# Patient Record
Sex: FEMALE | Race: BLACK OR AFRICAN AMERICAN | URBAN - METROPOLITAN AREA
[De-identification: names, ages, dates, MRNs, and addresses within clinical notes are randomized per-mention and may not be internally consistent; named-entity substitution may affect disease eponyms.]

---

## 2020-12-03 ENCOUNTER — OFFICE VISIT (OUTPATIENT)
Dept: URGENT CARE | Age: 35
End: 2020-12-03
Payer: MEDICAID

## 2020-12-03 VITALS — OXYGEN SATURATION: 99 % | TEMPERATURE: 98.4 F | HEART RATE: 74 BPM | RESPIRATION RATE: 18 BRPM

## 2020-12-03 DIAGNOSIS — R43.2 LOSS OF TASTE: ICD-10-CM

## 2020-12-03 DIAGNOSIS — R05.9 COUGH: Primary | ICD-10-CM

## 2020-12-03 DIAGNOSIS — Z11.59 SCREENING FOR VIRAL DISEASE: ICD-10-CM

## 2020-12-03 PROCEDURE — 99203 OFFICE O/P NEW LOW 30 MIN: CPT | Performed by: FAMILY MEDICINE

## 2020-12-03 RX ORDER — DULOXETIN HYDROCHLORIDE 60 MG/1
60 CAPSULE, DELAYED RELEASE ORAL DAILY
COMMUNITY

## 2020-12-03 RX ORDER — ALPRAZOLAM 2 MG/1
2 TABLET ORAL AS NEEDED
COMMUNITY

## 2020-12-03 NOTE — LETTER
December 3, 2020 Dayami Pinzon [address] [facility] [employer] [address] [place] [id number] Dear Margie Lentz: 
Thank you for requesting access to [employer]. Please follow the instructions below to securely access and download your online medical record. [employer] allows you to send messages to your doctor, view your test results, renew your prescriptions, schedule appointments, and more. How Do I Sign Up? 1. In your internet browser, go to [email or web address]. 2. Click on the First Time User? Click Here link in the Sign In box. You will see the New Member Sign Up page. 3. Enter your [employer] Access Code exactly as it appears below. You will not need to use this code after youve completed the sign-up process. If you do not sign up before the expiration date, you must request a new code. [employer] Access Code: [email or web address] Expires: 1/17/2021  9:00 AM  
 
4. Enter the last four digits of your Social Security Number (xxxx) and Date of Birth (mm/dd/yyyy) as indicated and click Submit. You will be taken to the next sign-up page. 5. Create a [employer] ID. This will be your [employer] login ID and cannot be changed, so think of one that is secure and easy to remember. 6. Create a [employer] password. You can change your password at any time. 7. Enter your Password Reset Question and Answer. This can be used at a later time if you forget your password. 8. Enter your e-mail address. You will receive e-mail notification when new information is available in [address] 9. Click Sign Up. You can now view and download portions of your medical record. 10. Click the Download Summary menu link to download a portable copy of your medical information. Additional Information If you have questions, please visit the Frequently Asked Questions section of the [employer] website at [email or web address] Remember, [employer] is NOT to be used for urgent needs. For medical emergencies, dial 911. Now available from your iPhone and Android! Sincerely, [employer]

## 2020-12-03 NOTE — PROGRESS NOTES
This patient was seen at [address] [place] [address] Urgent Care while in their vehicle due to COVID-19 pandemic with PPE and focused examination in order to decrease community viral transmission. The patient/guardian gave verbal consent to treat. Alaina Jaeger is a 38 y.o. female who presents with cough, nasal congestion, and loss of taste/smell x 1 week. No known COVID-19 contacts, but father has similar sx. Denies fever, SOB. The history is provided by the patient. History reviewed. No pertinent past medical history. History reviewed. No pertinent surgical history. History reviewed. No pertinent family history.      Social History     Socioeconomic History    Marital status: UNKNOWN     Spouse name: Not on file    Number of children: Not on file    Years of education: Not on file    Highest education level: Not on file   Occupational History    Not on file   Social Needs    Financial resource strain: Not on file    Food insecurity     Worry: Not on file     Inability: Not on file    Transportation needs     Medical: Not on file     Non-medical: Not on file   Tobacco Use    Smoking status: Never Smoker    Smokeless tobacco: Never Used   Substance and Sexual Activity    Alcohol use: Not on file    Drug use: Not on file    Sexual activity: Not on file   Lifestyle    Physical activity     Days per week: Not on file     Minutes per session: Not on file    Stress: Not on file   Relationships    Social connections     Talks on phone: Not on file     Gets together: Not on file     Attends Protestant service: Not on file     Active member of club or organization: Not on file     Attends meetings of clubs or organizations: Not on file     Relationship status: Not on file    Intimate partner violence     Fear of current or ex partner: Not on file     Emotionally abused: Not on file     Physically abused: Not on file     Forced sexual activity: Not on file   Other Topics Concern    Not on file   Social History Narrative    Not on file                ALLERGIES: Patient has no known allergies. Review of Systems   Constitutional: Negative for activity change, appetite change, chills and fever. HENT: Positive for congestion and rhinorrhea. Negative for sore throat. Respiratory: Positive for cough. Negative for shortness of breath and wheezing. Cardiovascular: Negative for chest pain. Gastrointestinal: Negative for abdominal pain, diarrhea, nausea and vomiting. Musculoskeletal: Negative for myalgias. Neurological: Negative for headaches. Vitals:    12/03/20 0847   Pulse: 74   Resp: 18   Temp: 98.4 °F (36.9 °C)   SpO2: 99%       Physical Exam  Vitals signs and nursing note reviewed. Constitutional:       General: She is not in acute distress. Appearance: She is well-developed. She is not diaphoretic. Pulmonary:      Effort: Pulmonary effort is normal. No respiratory distress. Breath sounds: Normal breath sounds. No stridor. No wheezing, rhonchi or rales. Neurological:      Mental Status: She is alert. Psychiatric:         Behavior: Behavior normal.         Thought Content: Thought content normal.         Judgment: Judgment normal.         MDM    ICD-10-CM ICD-9-CM   1. Cough  R05 786.2   2. Loss of taste  R43.2 781.1   3.  Screening for viral disease  Z11.59 V73.99       Orders Placed This Encounter    NOVEL CORONAVIRUS (COVID-19)     Scheduling Instructions:      1) Due to current limited availability of the COVID-19 PCR test, tests will be prioritized and may not be completed.              2) Order only if the test result will change clinical management or necessary for a return to mission-critical employment decision.              3) Print and instruct patient to adhere to CDC home isolation program. (Link Above)              4) Set up or refer patient for a monitoring program.              5) Have patient sign up for and leverage [employer] (if not previously done).     Order Specific Question:   Is this test for diagnosis or screening? Answer:   Diagnosis of ill patient     Order Specific Question:   Symptomatic for COVID-19 as defined by CDC? Answer:   Yes     Order Specific Question:   Date of Symptom Onset     Answer:   11/26/2020     Order Specific Question:   Hospitalized for COVID-19? Answer:   No     Order Specific Question:   Admitted to ICU for COVID-19? Answer:   No     Order Specific Question:   Employed in healthcare setting? Answer:   No     Order Specific Question:   Resident in a congregate (group) care setting? Answer:   No     Order Specific Question:   Pregnant? Answer:   No     Order Specific Question:   Previously tested for COVID-19? Answer:   No        Quarantine  Deep breathing exercises, ambulation      If signs and symptoms become worse the pt is to go to the ER.          Procedures

## 2020-12-07 ENCOUNTER — DOCUMENTATION ONLY (OUTPATIENT)
Dept: URGENT CARE | Age: 35
End: 2020-12-07

## 2020-12-07 LAB — SARS-COV-2, NAA: DETECTED

## 2020-12-07 NOTE — PROGRESS NOTES
Called, spoke with Pt. Two pt identifiers confirmed. Pt called in for covid results. Pt informed per NP Arely Isaias she is positive. Asked patient how she was feeling. Pt stated she just lost her taste and smell. Pt informed to quarantine 10-14 days per CDC. Pt informed if symptoms get worse or develop fever or SOB to go to the ER. Pt also informed to follow up with PCP. Pt verbalized understanding of information discussed w/ no further questions at this time.

## 2020-12-08 NOTE — PROGRESS NOTES
I notified patient of positive COVID-19 result. Reports has fatigue and loss of taste/smell otherwise feels fine. Denies SOB, weakness. Eating/drinking well. Advised to quarantine x 14 days from sx onset. ER if SOB, lethargy.

## 2020-12-16 ENCOUNTER — OFFICE VISIT (OUTPATIENT)
Dept: URGENT CARE | Age: 35
End: 2020-12-16
Payer: MEDICAID

## 2020-12-16 VITALS — OXYGEN SATURATION: 99 % | TEMPERATURE: 98.7 F | RESPIRATION RATE: 18 BRPM | HEART RATE: 65 BPM

## 2020-12-16 DIAGNOSIS — Z20.822 EXPOSURE TO COVID-19 VIRUS: Primary | ICD-10-CM

## 2020-12-16 PROCEDURE — 99212 OFFICE O/P EST SF 10 MIN: CPT | Performed by: FAMILY MEDICINE

## 2020-12-16 NOTE — PROGRESS NOTES
This patient was seen at [address] [place] [address] Urgent Care while in their vehicle due to COVID-19 pandemic with PPE and focused examination in order to decrease community viral transmission. The patient/guardian gave verbal consent to treat. here for repeat test  H/o covid  2- weeks before- still no taste/ smell- no other sxs    History reviewed. No pertinent past medical history. History reviewed. No pertinent surgical history. History reviewed. No pertinent family history. Social History     Socioeconomic History    Marital status: UNKNOWN     Spouse name: Not on file    Number of children: Not on file    Years of education: Not on file    Highest education level: Not on file   Occupational History    Not on file   Social Needs    Financial resource strain: Not on file    Food insecurity     Worry: Not on file     Inability: Not on file    Transportation needs     Medical: Not on file     Non-medical: Not on file   Tobacco Use    Smoking status: Never Smoker    Smokeless tobacco: Never Used   Substance and Sexual Activity    Alcohol use: Not on file    Drug use: Not on file    Sexual activity: Not on file   Lifestyle    Physical activity     Days per week: Not on file     Minutes per session: Not on file    Stress: Not on file   Relationships    Social connections     Talks on phone: Not on file     Gets together: Not on file     Attends Faith service: Not on file     Active member of club or organization: Not on file     Attends meetings of clubs or organizations: Not on file     Relationship status: Not on file    Intimate partner violence     Fear of current or ex partner: Not on file     Emotionally abused: Not on file     Physically abused: Not on file     Forced sexual activity: Not on file   Other Topics Concern    Not on file   Social History Narrative    Not on file                ALLERGIES: Patient has no known allergies.     Review of Systems   All other systems reviewed and are negative. Vitals:    12/16/20 1214   Pulse: 65   Resp: 18   Temp: 98.7 °F (37.1 °C)   SpO2: 99%       Physical Exam  Vitals signs and nursing note reviewed. Constitutional:       General: She is not in acute distress. Appearance: She is not ill-appearing. Pulmonary:      Effort: Pulmonary effort is normal. No respiratory distress. Breath sounds: No wheezing. MDM    Procedures             ICD-10-CM ICD-9-CM    1. Exposure to COVID-19 virus  Z20.828 V01.79 NOVEL CORONAVIRUS (COVID-19)     No orders of the defined types were placed in this encounter. No results found for any visits on 12/16/20. The patients condition was discussed with the patient and they understand. The patient is to follow up with primary care doctor. If signs and symptoms become worse the pt is to go to the ER. The patient is to take medications as prescribed.

## 2020-12-18 LAB — SARS-COV-2, NAA: NOT DETECTED

## 2023-05-11 RX ORDER — ALPRAZOLAM 2 MG/1
TABLET ORAL PRN
COMMUNITY

## 2023-05-11 RX ORDER — DULOXETIN HYDROCHLORIDE 60 MG/1
60 CAPSULE, DELAYED RELEASE ORAL DAILY
COMMUNITY

## 2023-09-12 ENCOUNTER — OFFICE VISIT (OUTPATIENT)
Age: 38
End: 2023-09-12
Payer: COMMERCIAL

## 2023-09-12 ENCOUNTER — TELEPHONE (OUTPATIENT)
Age: 38
End: 2023-09-12

## 2023-09-12 VITALS — WEIGHT: 225 LBS | DIASTOLIC BLOOD PRESSURE: 72 MMHG | SYSTOLIC BLOOD PRESSURE: 116 MMHG

## 2023-09-12 DIAGNOSIS — O21.9 NAUSEA AND VOMITING OF PREGNANCY, ANTEPARTUM: ICD-10-CM

## 2023-09-12 DIAGNOSIS — Z32.00 ENCOUNTER FOR CONFIRMATION OF PREGNANCY TEST RESULT WITH PHYSICAL EXAMINATION: ICD-10-CM

## 2023-09-12 DIAGNOSIS — N92.6 MISSED MENSES: Primary | ICD-10-CM

## 2023-09-12 LAB
HCG, PREGNANCY, URINE, POC: POSITIVE
VALID INTERNAL CONTROL, POC: YES

## 2023-09-12 PROCEDURE — 81025 URINE PREGNANCY TEST: CPT | Performed by: OBSTETRICS & GYNECOLOGY

## 2023-09-12 PROCEDURE — 99203 OFFICE O/P NEW LOW 30 MIN: CPT | Performed by: OBSTETRICS & GYNECOLOGY

## 2023-09-12 RX ORDER — ONDANSETRON 4 MG/1
4 TABLET, FILM COATED ORAL EVERY 8 HOURS PRN
Qty: 60 TABLET | Refills: 1 | Status: SHIPPED | OUTPATIENT
Start: 2023-09-12

## 2023-09-12 NOTE — PROGRESS NOTES
Pregnancy Confirmation and Problem Visit    Harjinder Vazquez is a 43 y.o.  at [facility] [place] by unsure LMP presenting for problem visit. Her main concern today is: newly pregnant with n/v.    Believes she's going through 'HG'. Can't eat or drink and urinates only once all day. 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 Sleeps all the time. Has had weight loss surgery (gastric sleeve) and 'wasn't big on the vitamins she was suppose to take'. Unsure if this is related. 1 son - 22 yo [address]    Ob/Gyn Hx:  - 1    LMP- 23, unsure  Menarche- approx 12yo  Menses- regular  Contraception- none  STI- denies  ? SA- yes    Health maintenance:  Pap-unsure; denies hx of abnormal pap smears in the past  Gardasil- never    History reviewed. No pertinent past medical history. No past surgical history on file. No family history on file.     Social History     Socioeconomic History    Marital status: Unknown     Spouse name: Not on file    Number of children: Not on file    Years of education: Not on file    Highest education level: Not on file   Occupational History    Not on file   Tobacco Use    Smoking status: Never    Smokeless tobacco: Never   Substance and Sexual Activity    Alcohol use: Not on file    Drug use: Not on file    Sexual activity: Not on file   Other Topics Concern    Not on file   Social History Narrative    Not on file     Social Determinants of Health     Financial Resource Strain: Not on file   Food Insecurity: Not on file   Transportation Needs: Not on file   Physical Activity: Not on file   Stress: Not on file   Social Connections: Not on file   Intimate Partner Violence: Not on file   Housing Stability: Not on file       Current Outpatient Medications   Medication Sig Dispense Refill    Prenatal Vit-Fe Fumarate-FA (PRENATAL PO) Take by mouth      DULoxetine (CYMBALTA) 60 MG extended release capsule Take 1 capsule by mouth daily      ALPRAZolam (XANAX) 2 MG tablet Take by mouth

## 2023-09-12 NOTE — TELEPHONE ENCOUNTER
Patient calling to complain of n/v in pregnancy. Due to not being a current patient of our practice, unable to offer medical advice. Advised patient to seek urgent care if she felt it was needed. Offered to place patient on schedule today with Dr. Jaquan Davis for a confirmation of pregnancy visit and to discuss a plan of care.   Patient accepted appointment for today at 10:40 AM.

## 2023-09-14 RX ORDER — PROMETHAZINE HYDROCHLORIDE 12.5 MG/1
12.5 TABLET ORAL EVERY 6 HOURS PRN
Qty: 30 TABLET | Refills: 2 | Status: SHIPPED | OUTPATIENT
Start: 2023-09-14

## 2023-09-18 ENCOUNTER — INITIAL PRENATAL (OUTPATIENT)
Age: 38
End: 2023-09-18

## 2023-09-18 VITALS — DIASTOLIC BLOOD PRESSURE: 66 MMHG | WEIGHT: 224.8 LBS | SYSTOLIC BLOOD PRESSURE: 100 MMHG

## 2023-09-18 DIAGNOSIS — Z34.90 PREGNANCY, UNSPECIFIED GESTATIONAL AGE: ICD-10-CM

## 2023-09-18 DIAGNOSIS — Z01.419 WELL WOMAN EXAM WITH ROUTINE GYNECOLOGICAL EXAM: Primary | ICD-10-CM

## 2023-09-18 DIAGNOSIS — Z11.3 SCREENING EXAMINATION FOR STD (SEXUALLY TRANSMITTED DISEASE): ICD-10-CM

## 2023-09-18 DIAGNOSIS — Z11.51 ENCOUNTER FOR SCREENING FOR HUMAN PAPILLOMAVIRUS (HPV): ICD-10-CM

## 2023-09-18 PROCEDURE — 0502F SUBSEQUENT PRENATAL CARE: CPT | Performed by: OBSTETRICS & GYNECOLOGY

## 2023-09-18 NOTE — PROGRESS NOTES
Status:  Orientation: grossly oriented to person, place and time  Mood and Affect: mood normal, affect appropriate      Assessment/Plan:  Primary Provider: Yelena Brown    43 y.o.  with MARTIN 24 by 9 wk RUIZ US (unsure LMP). H/o CS x1 - NRFS, was 10cm, but only pushed for a short duration when had FHR decelerations and was taken for CS. Strongly desires REPEAT CS at 39 wks, declines TOLAC. IUP: ordonez viable IUP on 9 wk scan, ?chorionic bump vs. Hematoma --> Repeat US next visit  -Anatomy scan: with MFM dt AMA and h/o bariatric surgery  -counseled on nutrition and proper weight gain in pregnancy as well as foods to avoid  -discussed other high yield topics including appropriate exercise levels, sexual activity, toxoplasmosis precautions, toxin avoidance, travel advice (including zika travel warnings), covid precautions    Pregnancy Problems:  -severe N/V - unisom/B6, phenergan (zofran not tolerated) --> advised urgent care if unable to keep fluids down as may need IV hydration and electrolyte checks, will cont to monitor weight  -AMA - aneuploidy screen next visit, FAS with MFM, ANS    PMH:  -h/o gastric sleeve - continue PNV, advised her to check with bariatric specialist about vitamin/micronutrient supplementation in pregnancy, discussed total vit A should be limited to 5000 IU daily to avoid retinoid embryopathy. Consider supplements of the following after bariatric procedures: B1 1.4mg, VitD 400IU, vitK 120mcg, zinc 11mg, biotin 30mcg, iron 65mg, folate 800mcg, calcium citrate 1200mg, vit B12 500mcg/day PO.  Labs at next visit w/ PNL: CBC, ferritin, iron, B12, thiamine, folate, calcium, vit D.   -anxiety/depression - on cymbalta 60mg daily    Genetics/Carrier screening: pano/horizon next visit    PNL: new OB labs next visit / pap   -Glucola: NOT RECOMMENDED d/t bariatric surgery --> will need to check sugars x 1 week  -28 week

## 2023-09-19 ENCOUNTER — CLINICAL DOCUMENTATION (OUTPATIENT)
Age: 38
End: 2023-09-19

## 2023-09-19 ENCOUNTER — TELEPHONE (OUTPATIENT)
Age: 38
End: 2023-09-19

## 2023-09-19 NOTE — TELEPHONE ENCOUNTER
Patient called, name and  verified. Patient is returning Dr. Marylu Carbajal call. She is okay with the US and appt change but stated that the message said you wanted to go over the findings from the ultra sound. If you could give her a call at your earliest convenience.

## 2023-09-19 NOTE — PROGRESS NOTES
MD Elizabeth Awad, FRANCISCON  Please schedule patient for repeat US next visit to follow up placental finding ?hematoma/chorionic bump, unclear clinical significance. I forgot to review this with patient at her visit, but have called her and left a voicemail to discuss, if she calls back or if you are able to get in touch with her I would be glad to speak with her about this. thank you! Seferino Ray MD  9/18/2023  5:16 PM     US added to next visit at 2:30 pm per MD.  Bo Lr sent to patient advising her of added Ultrasound.

## 2023-09-21 ENCOUNTER — TELEPHONE (OUTPATIENT)
Age: 38
End: 2023-09-21

## 2023-09-21 NOTE — TELEPHONE ENCOUNTER
Dr. Boone Shreyas response: Called again to reach patient to discuss ultrasound findings, no response x2, left voicemail for her to call back so that we can review her results.  ?chorionic bump

## 2023-09-21 NOTE — TELEPHONE ENCOUNTER
Patient returned your call to go over US findings from previous imaging. She is available any time today to discuss.

## 2023-09-22 ENCOUNTER — TELEPHONE (OUTPATIENT)
Age: 38
End: 2023-09-22

## 2023-09-22 NOTE — TELEPHONE ENCOUNTER
Patient is calling in returning your phone call. She said you called yesterday evening but she was sleep. She is requesting a call back and she stated that if you are unable to reach her, then it is okay to call her Mother Dayana Irizarry @ [phone]. HIPAA verified, mom is on her release of information.

## 2023-09-24 LAB
C TRACH RRNA CVX QL NAA+PROBE: NEGATIVE
CYTOLOGIST CVX/VAG CYTO: NORMAL
CYTOLOGY CVX/VAG DOC CYTO: NORMAL
CYTOLOGY CVX/VAG DOC THIN PREP: NORMAL
DX ICD CODE: NORMAL
HPV GENOTYPE REFLEX: NORMAL
HPV I/H RISK 4 DNA CVX QL PROBE+SIG AMP: NEGATIVE
Lab: NORMAL
N GONORRHOEA RRNA CVX QL NAA+PROBE: NEGATIVE
OTHER STN SPEC: NORMAL
STAT OF ADQ CVX/VAG CYTO-IMP: NORMAL
T VAGINALIS RRNA SPEC QL NAA+PROBE: NEGATIVE

## 2023-10-16 ENCOUNTER — ROUTINE PRENATAL (OUTPATIENT)
Age: 38
End: 2023-10-16

## 2023-10-16 VITALS — WEIGHT: 234.2 LBS | DIASTOLIC BLOOD PRESSURE: 70 MMHG | SYSTOLIC BLOOD PRESSURE: 106 MMHG

## 2023-10-16 DIAGNOSIS — Z34.90 PREGNANCY, UNSPECIFIED GESTATIONAL AGE: Primary | ICD-10-CM

## 2023-10-16 DIAGNOSIS — Z98.84 HISTORY OF GASTRIC BYPASS: ICD-10-CM

## 2023-10-16 PROCEDURE — 0502F SUBSEQUENT PRENATAL CARE: CPT | Performed by: OBSTETRICS & GYNECOLOGY

## 2023-10-16 NOTE — PROGRESS NOTES
TA ULTRASOUND PERFORMED 10/16/23  A SINGLE VIABLE 12W6D IUP IS SEEN WITH NORMAL CARDIAC RHYTHM. GESTATIONAL AGE BASED ON PREVIOUS ULTRASOUND. POSSIBLE CHORONIC BUMP NOT VISUALIZED ON  TODAYS EXAM. THERE APPEARS TO BE A FUNDAL PLACENTA. A NORMAL YOLK SAC IS SEEN. RIGHT OVARY APPEARS WNL. LEFT OVARY APPEARS WNL. NO FREE FLUID IS SEEN IN THE CDS. IOB labs today, desires panorama, declines horizon. N/V significantly improved  Declines flu vaccine today  Inquiring about possibility of BTL/bilateral salpingectomy given severity of N/V symptoms with this pregnancy and other factors making her medically high risk for any future pregnancies (AMA, obesity, prior bariatric surgery, this will be her 2nd CS). Requested ethics consult.     Mere Lira MD  10/16/2023  3:47 PM

## 2023-10-17 LAB
25(OH)D3+25(OH)D2 SERPL-MCNC: 30.8 NG/ML (ref 30–100)
ABO GROUP BLD: NORMAL
BLD GP AB SCN SERPL QL: NEGATIVE
CALCIUM SERPL-MCNC: 9.3 MG/DL (ref 8.7–10.2)
ERYTHROCYTE [DISTWIDTH] IN BLOOD BY AUTOMATED COUNT: 17 % (ref 11.7–15.4)
FERRITIN SERPL-MCNC: 12 NG/ML (ref 15–150)
FOLATE SERPL-MCNC: 19 NG/ML
HBV SURFACE AG SERPL QL IA: NEGATIVE
HCT VFR BLD AUTO: 32.2 % (ref 34–46.6)
HCV IGG SERPL QL IA: NON REACTIVE
HGB BLD-MCNC: 10.3 G/DL (ref 11.1–15.9)
HIV 1+2 AB+HIV1 P24 AG SERPL QL IA: NON REACTIVE
IRON SATN MFR SERPL: 8 % (ref 15–55)
IRON SERPL-MCNC: 35 UG/DL (ref 27–159)
MCH RBC QN AUTO: 24.5 PG (ref 26.6–33)
MCHC RBC AUTO-ENTMCNC: 32 G/DL (ref 31.5–35.7)
MCV RBC AUTO: 77 FL (ref 79–97)
PLATELET # BLD AUTO: 257 X10E3/UL (ref 150–450)
RBC # BLD AUTO: 4.2 X10E6/UL (ref 3.77–5.28)
RH BLD: POSITIVE
RUBV IGG SERPL IA-ACNC: 2.94 INDEX
TIBC SERPL-MCNC: 421 UG/DL (ref 250–450)
UIBC SERPL-MCNC: 386 UG/DL (ref 131–425)
VIT B12 SERPL-MCNC: 305 PG/ML (ref 232–1245)
VZV IGG SER IA-ACNC: 269 INDEX
WBC # BLD AUTO: 7 X10E3/UL (ref 3.4–10.8)

## 2023-10-18 LAB — TREPONEMA PALLIDUM IGG+IGM AB [PRESENCE] IN SERUM OR PLASMA BY IMMUNOASSAY: NON REACTIVE

## 2023-10-19 LAB
BACTERIA UR CULT: NORMAL
HGB A MFR BLD ELPH: 97.8 % (ref 96.4–98.8)
HGB A2 MFR BLD ELPH: 2.2 % (ref 1.8–3.2)
HGB F MFR BLD ELPH: 0 % (ref 0–2)
HGB FRACT BLD-IMP: NORMAL
HGB S MFR BLD ELPH: 0 %

## 2023-11-13 ENCOUNTER — ROUTINE PRENATAL (OUTPATIENT)
Age: 38
End: 2023-11-13

## 2023-11-13 VITALS — DIASTOLIC BLOOD PRESSURE: 76 MMHG | WEIGHT: 243.8 LBS | SYSTOLIC BLOOD PRESSURE: 116 MMHG

## 2023-11-13 DIAGNOSIS — O09.522 MULTIGRAVIDA OF ADVANCED MATERNAL AGE IN SECOND TRIMESTER: ICD-10-CM

## 2023-11-13 DIAGNOSIS — Z98.84 HISTORY OF BARIATRIC SURGERY: ICD-10-CM

## 2023-11-13 DIAGNOSIS — Z34.90 PREGNANCY, UNSPECIFIED GESTATIONAL AGE: Primary | ICD-10-CM

## 2023-11-13 PROCEDURE — 0502F SUBSEQUENT PRENATAL CARE: CPT | Performed by: OBSTETRICS & GYNECOLOGY

## 2023-11-13 NOTE — PROGRESS NOTES
Nausea improved; now only with laying down  Now worried she is gaining too much weight. Discussed diet/exercise.   Declines msafp today  Referral to MFM placed for FAS  Having a girl!!!!!!!

## 2023-12-11 ENCOUNTER — ROUTINE PRENATAL (OUTPATIENT)
Age: 38
End: 2023-12-11
Payer: COMMERCIAL

## 2023-12-11 VITALS
DIASTOLIC BLOOD PRESSURE: 75 MMHG | HEART RATE: 101 BPM | SYSTOLIC BLOOD PRESSURE: 119 MMHG | BODY MASS INDEX: 32.17 KG/M2 | HEIGHT: 72 IN

## 2023-12-11 DIAGNOSIS — Z3A.20 20 WEEKS GESTATION OF PREGNANCY: Primary | ICD-10-CM

## 2023-12-11 PROCEDURE — 76811 OB US DETAILED SNGL FETUS: CPT | Performed by: OBSTETRICS & GYNECOLOGY

## 2023-12-11 PROCEDURE — 99214 OFFICE O/P EST MOD 30 MIN: CPT | Performed by: OBSTETRICS & GYNECOLOGY

## 2023-12-11 PROCEDURE — 76817 TRANSVAGINAL US OBSTETRIC: CPT | Performed by: OBSTETRICS & GYNECOLOGY

## 2023-12-12 ENCOUNTER — ROUTINE PRENATAL (OUTPATIENT)
Age: 38
End: 2023-12-12

## 2023-12-12 VITALS — BODY MASS INDEX: 32.72 KG/M2 | WEIGHT: 248 LBS | SYSTOLIC BLOOD PRESSURE: 122 MMHG | DIASTOLIC BLOOD PRESSURE: 78 MMHG

## 2023-12-12 DIAGNOSIS — Z34.90 PREGNANCY, UNSPECIFIED GESTATIONAL AGE: Primary | ICD-10-CM

## 2023-12-12 PROCEDURE — 0502F SUBSEQUENT PRENATAL CARE: CPT | Performed by: OBSTETRICS & GYNECOLOGY

## 2023-12-12 NOTE — PROGRESS NOTES
Pt reports with no medical complaints.    MFM yesterday notable for short cervix --> repeat scan in 1 week    RTC 4 wks

## 2023-12-19 PROBLEM — O09.522 MULTIGRAVIDA OF ADVANCED MATERNAL AGE IN SECOND TRIMESTER: Status: ACTIVE | Noted: 2023-12-19

## 2023-12-19 PROBLEM — Z98.84 HISTORY OF BARIATRIC SURGERY: Status: ACTIVE | Noted: 2023-12-19

## 2023-12-19 PROBLEM — E66.811 CLASS 1 OBESITY DUE TO EXCESS CALORIES WITHOUT SERIOUS COMORBIDITY WITH BODY MASS INDEX (BMI) OF 32.0 TO 32.9 IN ADULT: Status: ACTIVE | Noted: 2023-12-19

## 2023-12-19 PROBLEM — N88.3 SHORT CERVIX: Status: ACTIVE | Noted: 2023-12-19

## 2023-12-19 PROBLEM — E66.09 CLASS 1 OBESITY DUE TO EXCESS CALORIES WITHOUT SERIOUS COMORBIDITY WITH BODY MASS INDEX (BMI) OF 32.0 TO 32.9 IN ADULT: Status: ACTIVE | Noted: 2023-12-19

## 2023-12-28 ENCOUNTER — ROUTINE PRENATAL (OUTPATIENT)
Age: 38
End: 2023-12-28

## 2023-12-28 VITALS — DIASTOLIC BLOOD PRESSURE: 74 MMHG | HEART RATE: 73 BPM | SYSTOLIC BLOOD PRESSURE: 115 MMHG

## 2023-12-28 DIAGNOSIS — N88.3 SHORT CERVIX: Primary | ICD-10-CM

## 2023-12-28 DIAGNOSIS — Z98.84 HISTORY OF BARIATRIC SURGERY: ICD-10-CM

## 2023-12-28 DIAGNOSIS — E66.09 CLASS 1 OBESITY DUE TO EXCESS CALORIES WITHOUT SERIOUS COMORBIDITY WITH BODY MASS INDEX (BMI) OF 32.0 TO 32.9 IN ADULT: ICD-10-CM

## 2023-12-28 DIAGNOSIS — O09.522 MULTIGRAVIDA OF ADVANCED MATERNAL AGE IN SECOND TRIMESTER: ICD-10-CM

## 2023-12-28 NOTE — PROCEDURES
PATIENT: Julisa Holder   -  : 1985   -  DOS:2023   -  INTERPRETING PROVIDER:[place], Forestine Bread,   Indication  ========    Advanced Maternal Age, History of bariatric surgery    Method  ======    Transabdominal and transvaginal ultrasound examination. View: Sufficient    Pregnancy  =========    Rene pregnancy. Number of fetuses: 1    Dating  ======    LMP on: 2023  GA by LMP 23 w + 2 d  MARTIN by LMP: 2024  Assigned: based on the LMP, selected on 2023  Assigned GA 23 w + 2 d  Assigned MARTIN: 2024    General Evaluation  ==============    Cardiac activity present.  bpm. Fetal movements: visualized. Presentation: Transverse, head to maternal left  Placenta: Placental site: posterior, appropriate distance from the internal os  Umbilical cord: Cord vessels: 3 vessel cord. Insertion site: placental insertion normal  Amniotic fluid: Amount of AF: normal. MVP 5.4 cm    Fetal Anatomy  ===========    Stomach: normal  Kidneys: normal  Bladder: normal  Wants to know fetal sex: yes    Maternal Structures  ===============    Uterus / Cervix  Cervix: Visualized  Approach: Transvaginal  Cervical length 3.54 cm  Second approach: Transvaginal  Cervical length (2) 3.08 cm  Funneling: Funneling absent  Other: s/p pressure- 3.47 cm    Findings  =======    Viable Rene pregnancy at 23w 2d by clinical dates. Anatomy visualized as stated above. Amniotic fluid is normal.  Placental site is posterior, appropriate distance from the internal os. Transverse, head to maternal left presentation. Transvaginal ultrasound was performed to s/p pressure- 3.47 cm. The cervix measures 3.54 cm in length. There is no evidence of funneling with and without fundal pressure.     Plan of Care  ==========    This 43year old  with the following issues:    #1 AMA/class 1 obesity  -fetal anatomy is consistent with low risk NIPT; declined amniocentesis  -prophylactic low dose ASA is recommended; patient

## 2024-01-09 ENCOUNTER — ROUTINE PRENATAL (OUTPATIENT)
Age: 39
End: 2024-01-09

## 2024-01-09 VITALS — SYSTOLIC BLOOD PRESSURE: 110 MMHG | DIASTOLIC BLOOD PRESSURE: 74 MMHG | BODY MASS INDEX: 33.78 KG/M2 | WEIGHT: 256 LBS

## 2024-01-09 DIAGNOSIS — Z34.90 PREGNANCY, UNSPECIFIED GESTATIONAL AGE: Primary | ICD-10-CM

## 2024-01-09 PROCEDURE — 0502F SUBSEQUENT PRENATAL CARE: CPT | Performed by: OBSTETRICS & GYNECOLOGY

## 2024-01-09 RX ORDER — LANCETS 30 GAUGE
EACH MISCELLANEOUS
Qty: 100 EACH | Refills: 5 | Status: SHIPPED | OUTPATIENT
Start: 2024-01-09

## 2024-01-09 RX ORDER — LANCETS 30 GAUGE
EACH MISCELLANEOUS
Qty: 100 EACH | Refills: 5 | Status: SHIPPED | OUTPATIENT
Start: 2024-01-09 | End: 2024-01-09 | Stop reason: SDUPTHER

## 2024-01-09 RX ORDER — BLOOD-GLUCOSE METER
KIT MISCELLANEOUS
Qty: 1 KIT | Refills: 0 | Status: SHIPPED | OUTPATIENT
Start: 2024-01-09

## 2024-01-09 RX ORDER — GLUCOSAMINE HCL/CHONDROITIN SU 500-400 MG
CAPSULE ORAL
Qty: 100 STRIP | Refills: 0 | Status: SHIPPED | OUTPATIENT
Start: 2024-01-09

## 2024-01-09 RX ORDER — BLOOD-GLUCOSE METER
KIT MISCELLANEOUS
Qty: 1 KIT | Refills: 0 | Status: SHIPPED | OUTPATIENT
Start: 2024-01-09 | End: 2024-01-09 | Stop reason: SDUPTHER

## 2024-01-09 RX ORDER — DULOXETIN HYDROCHLORIDE 60 MG/1
60 CAPSULE, DELAYED RELEASE ORAL DAILY
Qty: 30 CAPSULE | Refills: 3 | Status: SHIPPED | OUTPATIENT
Start: 2024-01-09

## 2024-01-09 RX ORDER — GLUCOSAMINE HCL/CHONDROITIN SU 500-400 MG
CAPSULE ORAL
Qty: 100 STRIP | Refills: 0 | Status: SHIPPED | OUTPATIENT
Start: 2024-01-09 | End: 2024-01-09 | Stop reason: SDUPTHER

## 2024-01-09 RX ORDER — ASPIRIN 81 MG/1
81 TABLET, CHEWABLE ORAL DAILY
COMMUNITY

## 2024-01-09 NOTE — PROGRESS NOTES
Saw PNC 12/28, CL back in normal range. follow up 1/11 for growth scan.   No glucola today d/t bariatric surgery --> check BG x 1 week, log and supplies provided  Will plan 3rd tri labs and tdap next visit  Doing well overall  Some heart burn.  Suggested Tums/pepcid PRN  Ran out of Cymbalta 60 mg; refill provided today

## 2024-01-11 ENCOUNTER — ROUTINE PRENATAL (OUTPATIENT)
Age: 39
End: 2024-01-11

## 2024-01-11 VITALS — SYSTOLIC BLOOD PRESSURE: 101 MMHG | HEART RATE: 85 BPM | DIASTOLIC BLOOD PRESSURE: 65 MMHG

## 2024-01-11 DIAGNOSIS — Z98.84 HISTORY OF BARIATRIC SURGERY: ICD-10-CM

## 2024-01-11 DIAGNOSIS — N88.3 SHORT CERVIX: ICD-10-CM

## 2024-01-11 DIAGNOSIS — E66.09 CLASS 1 OBESITY DUE TO EXCESS CALORIES WITHOUT SERIOUS COMORBIDITY WITH BODY MASS INDEX (BMI) OF 32.0 TO 32.9 IN ADULT: ICD-10-CM

## 2024-01-11 DIAGNOSIS — Z98.891 HISTORY OF CESAREAN SECTION, UNKNOWN SCAR: ICD-10-CM

## 2024-01-11 DIAGNOSIS — Z3A.25 25 WEEKS GESTATION OF PREGNANCY: Primary | ICD-10-CM

## 2024-01-11 DIAGNOSIS — O09.522 MULTIGRAVIDA OF ADVANCED MATERNAL AGE IN SECOND TRIMESTER: ICD-10-CM

## 2024-01-11 NOTE — PROCEDURES
PATIENT: KIRILL LINCOLN   -  : 1985   -  DOS:2024   -  INTERPRETING PROVIDER:Anita Kitchen,   Indication  ========    Advanced Maternal Age, History of bariatric surgery    Method  ======    Transabdominal ultrasound examination. View: Good view    Pregnancy  =========    Rene pregnancy. Number of fetuses: 1    Dating  ======    LMP on: 2023  GA by LMP 25 w + 2 d  MARTIN by LMP: 2024  Ultrasound examination on: 2024  GA by U/S based upon: AC, BPD, Femur, HC  GA by U/S 25 w + 4 d  MARTIN by U/S: 2024  Assigned: based on the LMP, selected on 2023  Assigned GA 25 w + 2 d  Assigned MARTIN: 2024    Fetal Biometry  ============    Standard  BPD 61.1 mm 24w 6d 26% Hadlock  OFD 83.9 mm 27w 2d 94% Mandy  .5 mm 25w 1d 24% Hadlock  Cerebellum tr 30.8 mm 26w 4d 87% Hill  .0 mm 26w 1d 69% Hadlock  Femur 48.5 mm 26w 2d 68% Hadlock  Humerus 43.1 mm 25w 5d 59% Mandy   g 25w 5d 72% Hadlock  EFW (lb) 1 lb  EFW (oz) 15 oz  EFW by: Hadlock (BPD-HC-AC-FL)  Extended   3.8 mm  Other Structures   bpm    General Evaluation  ==============    Cardiac activity present.  bpm. Fetal movements: visualized. Presentation: Breech  Placenta: Placental site: posterior, appropriate distance from the internal os  Umbilical cord: Cord vessels: 3 vessel cord. Insertion site: placental insertion normal  Amniotic fluid: Amount of AF: normal. MVP 4.6 cm. NICO 15.5 cm. Q1 2.8 cm, Q2 4.4 cm, Q3 3.8 cm, Q4 4.6 cm    Fetal Anatomy  ===========    Stomach: normal  Kidneys: normal  Bladder: normal  Wants to know fetal sex: yes    Findings  =======    Viable Rene pregnancy at 25w 2d by clinical dates.  EFW is 884 g at 72%, abdominal circumference at 69%.  Anatomy visualized as stated above.  Amniotic fluid is normal.  Placenta is posterior, appropriate distance from the internal os.  Breech presentation.    Plan of Care  ==========    This 38 year old  with the following

## 2024-01-30 DIAGNOSIS — M79.606 PREGNANCY RELATED LEG PAIN IN THIRD TRIMESTER, ANTEPARTUM: Primary | ICD-10-CM

## 2024-01-30 DIAGNOSIS — O26.893 PREGNANCY RELATED LEG PAIN IN THIRD TRIMESTER, ANTEPARTUM: Primary | ICD-10-CM

## 2024-02-05 SDOH — ECONOMIC STABILITY: FOOD INSECURITY: WITHIN THE PAST 12 MONTHS, YOU WORRIED THAT YOUR FOOD WOULD RUN OUT BEFORE YOU GOT MONEY TO BUY MORE.: NEVER TRUE

## 2024-02-05 SDOH — ECONOMIC STABILITY: HOUSING INSECURITY
IN THE LAST 12 MONTHS, WAS THERE A TIME WHEN YOU DID NOT HAVE A STEADY PLACE TO SLEEP OR SLEPT IN A SHELTER (INCLUDING NOW)?: NO

## 2024-02-05 SDOH — ECONOMIC STABILITY: FOOD INSECURITY: WITHIN THE PAST 12 MONTHS, THE FOOD YOU BOUGHT JUST DIDN'T LAST AND YOU DIDN'T HAVE MONEY TO GET MORE.: NEVER TRUE

## 2024-02-05 SDOH — ECONOMIC STABILITY: TRANSPORTATION INSECURITY
IN THE PAST 12 MONTHS, HAS LACK OF TRANSPORTATION KEPT YOU FROM MEETINGS, WORK, OR FROM GETTING THINGS NEEDED FOR DAILY LIVING?: NO

## 2024-02-05 SDOH — ECONOMIC STABILITY: INCOME INSECURITY: HOW HARD IS IT FOR YOU TO PAY FOR THE VERY BASICS LIKE FOOD, HOUSING, MEDICAL CARE, AND HEATING?: NOT VERY HARD

## 2024-02-06 ENCOUNTER — ROUTINE PRENATAL (OUTPATIENT)
Age: 39
End: 2024-02-06
Payer: COMMERCIAL

## 2024-02-06 VITALS — WEIGHT: 261.6 LBS | SYSTOLIC BLOOD PRESSURE: 104 MMHG | DIASTOLIC BLOOD PRESSURE: 60 MMHG | BODY MASS INDEX: 34.51 KG/M2

## 2024-02-06 DIAGNOSIS — Z34.90 PREGNANCY, UNSPECIFIED GESTATIONAL AGE: Primary | ICD-10-CM

## 2024-02-06 PROCEDURE — 90471 IMMUNIZATION ADMIN: CPT | Performed by: OBSTETRICS & GYNECOLOGY

## 2024-02-06 PROCEDURE — 0502F SUBSEQUENT PRENATAL CARE: CPT | Performed by: OBSTETRICS & GYNECOLOGY

## 2024-02-06 PROCEDURE — 90715 TDAP VACCINE 7 YRS/> IM: CPT | Performed by: OBSTETRICS & GYNECOLOGY

## 2024-02-06 NOTE — PROGRESS NOTES
3rd tri labs today  No glucola.  Has been checking BS- see log  Accepts Tdap today  Doing well overall  +FM  Bilateral sciatica-- referral placed to PT.  Hasn't had a chance to call yet  C/o some vaginal numbness    RTC 2 wks

## 2024-02-06 NOTE — PROGRESS NOTES
Per MD orders, Tdap vaccine given IM in left deltoid.  No complaints noted.  See immunization tab for additional details. Cintia Flowers LPN  2/6/2024  3:43 PM

## 2024-02-07 LAB
BLD GP AB SCN SERPL QL: NEGATIVE
ERYTHROCYTE [DISTWIDTH] IN BLOOD BY AUTOMATED COUNT: 13.3 % (ref 11.7–15.4)
HCT VFR BLD AUTO: 28.7 % (ref 34–46.6)
HGB BLD-MCNC: 9.2 G/DL (ref 11.1–15.9)
HIV 1+2 AB+HIV1 P24 AG SERPL QL IA: NON REACTIVE
MCH RBC QN AUTO: 24 PG (ref 26.6–33)
MCHC RBC AUTO-ENTMCNC: 32.1 G/DL (ref 31.5–35.7)
MCV RBC AUTO: 75 FL (ref 79–97)
PLATELET # BLD AUTO: 247 X10E3/UL (ref 150–450)
RBC # BLD AUTO: 3.84 X10E6/UL (ref 3.77–5.28)
WBC # BLD AUTO: 7.6 X10E3/UL (ref 3.4–10.8)

## 2024-02-08 LAB — TREPONEMA PALLIDUM IGG+IGM AB [PRESENCE] IN SERUM OR PLASMA BY IMMUNOASSAY: NON REACTIVE

## 2024-02-13 ENCOUNTER — ROUTINE PRENATAL (OUTPATIENT)
Age: 39
End: 2024-02-13
Payer: COMMERCIAL

## 2024-02-13 VITALS — HEART RATE: 98 BPM | DIASTOLIC BLOOD PRESSURE: 62 MMHG | SYSTOLIC BLOOD PRESSURE: 98 MMHG

## 2024-02-13 DIAGNOSIS — O99.213 OBESITY IN PREGNANCY, ANTEPARTUM, THIRD TRIMESTER: ICD-10-CM

## 2024-02-13 DIAGNOSIS — O09.523 ADVANCED MATERNAL AGE IN MULTIGRAVIDA, THIRD TRIMESTER: ICD-10-CM

## 2024-02-13 DIAGNOSIS — O99.213 OBESITY IN PREGNANCY, ANTEPARTUM, THIRD TRIMESTER: Primary | ICD-10-CM

## 2024-02-13 DIAGNOSIS — O09.522 MULTIGRAVIDA OF ADVANCED MATERNAL AGE IN SECOND TRIMESTER: ICD-10-CM

## 2024-02-13 DIAGNOSIS — O99.843 PREGNANCY AFFECTED BY PREVIOUS BARIATRIC SURGERY, CURRENTLY IN THIRD TRIMESTER: ICD-10-CM

## 2024-02-13 DIAGNOSIS — E66.9 OBESITY (BMI 30-39.9): Primary | ICD-10-CM

## 2024-02-13 PROCEDURE — 99212 OFFICE O/P EST SF 10 MIN: CPT

## 2024-02-13 PROCEDURE — 76816 OB US FOLLOW-UP PER FETUS: CPT | Performed by: OBSTETRICS & GYNECOLOGY

## 2024-02-13 NOTE — PROGRESS NOTES
ASSESSMENT/PLAN:  1. Obesity in pregnancy, antepartum, third trimester  2. Multigravida of advanced maternal age in second trimester    Neisha is a 39 y/o  seen for the followin. AMA/class 1 obesity  -Did not complete glucola due to hx of bariatric surgery, pt checked BS x 1 week per OB. Glucose log reviewed. Glucose is well controlled.   -LR NIPT; declined amniocentesis  -Taking prophylactic low dose ASA for multiple risk factors for preE  -kick count instructions reviewed      2. Hx bariatric surgery  -gastric sleeve, several years ago.      3. Prior  for fetal indications  -placenta is posterior      4. Hx short cervix  - 23 length measures normal  -PTL precautions were reviewed      Recommendations:  Follow up in 4 weeks for Growth     Please see Viewpoint for ultrasound findings.         Subjective   Neisha Rdz (:  1985) is a 38 y.o. female,Established patient, here for evaluation of the following chief complaint(s):  AMA/Obesity           Objective   Physical Exam  Vitals reviewed.   Constitutional:       Appearance: Normal appearance.   Neurological:      Mental Status: She is alert.   Psychiatric:         Mood and Affect: Mood normal.         Judgment: Judgment normal.          On this date 2024 I have spent 15 minutes reviewing previous notes, test results and face to face with the patient discussing the diagnosis and importance of compliance with the treatment plan as well as documenting on the day of the visit.      An electronic signature was used to authenticate this note.    --ES Rowan - CNP

## 2024-02-13 NOTE — PROCEDURES
visualized as stated above.  Amniotic fluid is normal.  Placenta is posterior, appropriate distance from the internal os.  Cephalic presentation.    Plan of Care  ==========    NP Note 2024  Neisha is a 39 y/o  seen for the followin. AMA/class 1 obesity  -Did not complete glucola due to hx of bariatric surgery, pt checked BS x 1 week per OB. Glucose log reviewed. Glucose is well controlled.  -LR NIPT; declined amniocentesis  -Taking prophylactic low dose ASA for multiple risk factors for preE  -kick count instructions reviewed    2. Hx bariatric surgery  -gastric sleeve, several years ago.    3. Prior  for fetal indications  -placenta is posterior    4. Hx short cervix  - 23 length measures normal  -PTL precautions were reviewed    Recommendations:  Follow up in 4 weeks for Growth  Commence weekly  testing at 34 weeks due to AGA (near 40 at MARTIN) unless indicated earlier by clinical circumstances.      Patient was counseled on the findings. Questions and concerns were addressed.  Excluding time reading the ultrasound, a total of 15 minutes was spent on this visit reviewing previous notes, counseling the patient and documenting the findings in the  note.    Follow-up  ========    F/U 4 weeks Growth  Commence weekly  testing at 34 weeks due to AMA (near 40 at MARTIN), unless indicated earlier by clinical circumstances.    Coding  ======    Code: 83173  Description: Ultrasound, pregnant uterus, real time with image documentation, follow up, transabdominal approach per fetus

## 2024-02-20 ENCOUNTER — ROUTINE PRENATAL (OUTPATIENT)
Age: 39
End: 2024-02-20

## 2024-02-20 VITALS — DIASTOLIC BLOOD PRESSURE: 78 MMHG | SYSTOLIC BLOOD PRESSURE: 120 MMHG | BODY MASS INDEX: 35.23 KG/M2 | WEIGHT: 267 LBS

## 2024-02-20 DIAGNOSIS — Z34.90 PREGNANCY, UNSPECIFIED GESTATIONAL AGE: Primary | ICD-10-CM

## 2024-02-20 PROCEDURE — 0502F SUBSEQUENT PRENATAL CARE: CPT | Performed by: OBSTETRICS & GYNECOLOGY

## 2024-02-20 RX ORDER — RESPIRATORY SYNCYTIAL VIRUS VACCINE 120MCG/0.5
0.5 KIT INTRAMUSCULAR ONCE
Qty: 0.5 ML | Refills: 0 | Status: SHIPPED | OUTPATIENT
Start: 2024-02-20 | End: 2024-02-20

## 2024-02-20 NOTE — PROGRESS NOTES
Saw [facility] , follow up 3/14, Commence weekly  testing at 34 weeks due to AMA (near 40 at MARTIN)   Bilateral sciatica  +FM. Denies VB/LOF/HA's/VC.  Otherwise doing well.  Rx for RSV vaccine provided.    RTC 2 wk

## 2024-03-14 ENCOUNTER — ROUTINE PRENATAL (OUTPATIENT)
Age: 39
End: 2024-03-14

## 2024-03-14 VITALS — DIASTOLIC BLOOD PRESSURE: 76 MMHG | HEART RATE: 76 BPM | SYSTOLIC BLOOD PRESSURE: 116 MMHG

## 2024-03-14 DIAGNOSIS — O09.523 ADVANCED MATERNAL AGE IN MULTIGRAVIDA, THIRD TRIMESTER: Primary | ICD-10-CM

## 2024-03-14 NOTE — PROCEDURES
is noted.  Please note that sonographic estimation of fetal/birth weight is not an exact science and clinical correlation is advised.  No demonstrable abnormalities for gestational age of assessment and technical constraints of the examination.    The lower uterine segment is intact (posterior placenta).    Findings discussed and images reviewed.  She was advised of the limitations of ultrasound to detect all fetal abnormalities or conditions or predict birth/fetal weight with exact precision, especially with the  constraints of the examination today.    She relates good fetal movement; fetal movement assessment reinforced.  No new major issues since her last visit.    AMA/long interpregancy interval:  She will be 39 years old on 24. It has been 20 years since her first pregnancy.  Due to these factors I advise weekly  testing in one week.  F/U growth in four weeks.  She is planning on a repeat  at 39 weeks.          Following discussion, she related an understanding of the information provided today and had no further questions.  Total physician time including chart review/preparation and completion ~ 20 minutes.    She was accompanied by her mother (Ms. Rdz) on today's visit.    Recommendations  ==============    Commence weekly  testing in one week.  F/U growth in ~ four weeks.    Coding  ======    Code: 06635  Description: Ultrasound, pregnant uterus, real time with image documentation, follow up, transabdominal approach per fetus

## 2024-03-15 ENCOUNTER — ROUTINE PRENATAL (OUTPATIENT)
Age: 39
End: 2024-03-15

## 2024-03-15 VITALS — SYSTOLIC BLOOD PRESSURE: 120 MMHG | DIASTOLIC BLOOD PRESSURE: 82 MMHG | BODY MASS INDEX: 35.75 KG/M2 | WEIGHT: 271 LBS

## 2024-03-15 DIAGNOSIS — Z34.90 PREGNANCY, UNSPECIFIED GESTATIONAL AGE: Primary | ICD-10-CM

## 2024-03-15 NOTE — PROGRESS NOTES
Doing well, ready for baby. States she has been feeling \"faint\" on and off the last 2-3 weeks, usually in the morning after breakfast, associated with sweaty, shaky feeling. She has been having a lot of smoothies (banana, strawberry, pineapple). Discussed suspicion for blood sugar related symptoms due to glycemic index and sugar bolus with smoothies. Discussed protein-rich breakfast with healthy fats, and eliminating high-carb breakfast options. Heart and lung exam benign today. Discussed cardiology referral if symptoms not improved with dietary modification and ER for any worsening symptoms. Her son is with her today due to her feeling faint.  Active FM. No LOF, VB, or other concerns.   Following with [facility], scan 3/14: EFW is 2293 g at 33%, abdominal circumference at 20%. Anatomy visualized as stated above. Amniotic fluid is normal. Placenta is posterior, appropriate distance from the internal os. Cephalic presentation.  Commence weekly  simona ng in one week. F/U growth in 4 wks.  Next MFM appt 3/20  C/S scheduled     RTC 2 wks

## 2024-03-15 NOTE — PROGRESS NOTES
Saw MFM yesterday: EFW is 2293 g at 33%, abdominal circumference at 20%. Anatomy visualized as stated above. Amniotic fluid is normal. Placenta is posterior, appropriate distance from the internal os. Cephalic presentation.  Commence weekly  simona ng in one week. F/U growth in 4 wks.  Next [facility] appt 3/20    C/S scheduled

## 2024-03-19 ENCOUNTER — ROUTINE PRENATAL (OUTPATIENT)
Age: 39
End: 2024-03-19

## 2024-03-19 VITALS — SYSTOLIC BLOOD PRESSURE: 118 MMHG | DIASTOLIC BLOOD PRESSURE: 78 MMHG | WEIGHT: 277 LBS | BODY MASS INDEX: 36.55 KG/M2

## 2024-03-19 DIAGNOSIS — Z34.90 PREGNANCY, UNSPECIFIED GESTATIONAL AGE: Primary | ICD-10-CM

## 2024-03-19 DIAGNOSIS — O09.93 HIGH-RISK PREGNANCY IN THIRD TRIMESTER: ICD-10-CM

## 2024-03-19 DIAGNOSIS — O99.013 ANEMIA AFFECTING PREGNANCY IN THIRD TRIMESTER: ICD-10-CM

## 2024-03-19 DIAGNOSIS — R06.02 SOB (SHORTNESS OF BREATH): ICD-10-CM

## 2024-03-19 DIAGNOSIS — R42 DIZZY SPELLS: ICD-10-CM

## 2024-03-19 PROCEDURE — 0502F SUBSEQUENT PRENATAL CARE: CPT | Performed by: OBSTETRICS & GYNECOLOGY

## 2024-03-19 NOTE — PROGRESS NOTES
MFM tomorrow 3/20/24  +FM  Pt reports increase in nausea and SOB. Still having dizziness.  Denies HA, blurred vision, or swelling. Denies bleeding/spotting. Increased vaginal 'wetness' in the evenings.   Recheck CBC today, not taking iron so will refer for iron infusions, refer to cardiology dt dizziness and SOB.  Ruled out for ROM, neg pooling and neg nitrazine.    RTC 1 wk    Bhumi West MD  3/19/2024  3:46 PM

## 2024-03-20 ENCOUNTER — ROUTINE PRENATAL (OUTPATIENT)
Age: 39
End: 2024-03-20

## 2024-03-20 VITALS — SYSTOLIC BLOOD PRESSURE: 106 MMHG | HEART RATE: 84 BPM | DIASTOLIC BLOOD PRESSURE: 60 MMHG

## 2024-03-20 DIAGNOSIS — O99.019 ANEMIA AFFECTING PREGNANCY, ANTEPARTUM: ICD-10-CM

## 2024-03-20 DIAGNOSIS — R06.02 SOB (SHORTNESS OF BREATH): ICD-10-CM

## 2024-03-20 DIAGNOSIS — Z98.891 HISTORY OF CESAREAN SECTION, UNKNOWN SCAR: ICD-10-CM

## 2024-03-20 DIAGNOSIS — O09.523 ADVANCED MATERNAL AGE IN MULTIGRAVIDA, THIRD TRIMESTER: Primary | ICD-10-CM

## 2024-03-20 DIAGNOSIS — R42 DIZZINESS: ICD-10-CM

## 2024-03-20 DIAGNOSIS — Z98.84 HISTORY OF BARIATRIC SURGERY: ICD-10-CM

## 2024-03-20 DIAGNOSIS — O09.522 MULTIGRAVIDA OF ADVANCED MATERNAL AGE IN SECOND TRIMESTER: ICD-10-CM

## 2024-03-20 LAB
ERYTHROCYTE [DISTWIDTH] IN BLOOD BY AUTOMATED COUNT: 14.2 % (ref 11.7–15.4)
HCT VFR BLD AUTO: 28.3 % (ref 34–46.6)
HGB BLD-MCNC: 8.7 G/DL (ref 11.1–15.9)
MCH RBC QN AUTO: 22 PG (ref 26.6–33)
MCHC RBC AUTO-ENTMCNC: 30.7 G/DL (ref 31.5–35.7)
MCV RBC AUTO: 72 FL (ref 79–97)
PLATELET # BLD AUTO: 244 X10E3/UL (ref 150–450)
RBC # BLD AUTO: 3.95 X10E6/UL (ref 3.77–5.28)
WBC # BLD AUTO: 7.3 X10E3/UL (ref 3.4–10.8)

## 2024-03-20 RX ORDER — HEPARIN 100 UNIT/ML
500 SYRINGE INTRAVENOUS PRN
OUTPATIENT
Start: 2024-03-25

## 2024-03-20 NOTE — PROGRESS NOTES
on the day of the visit.      An electronic signature was used to authenticate this note.    --ES Rowan - CNP

## 2024-03-20 NOTE — PROCEDURES
PATIENT: NEISHA LINCOLN   -  : 1985   -  DOS:2024   -  INTERPRETING PROVIDER:Anita Kitchen,   Indication  ========    Advanced Maternal Age, History of bariatric surgery (gastric sleeve)    Method  ======    Transabdominal ultrasound examination. View: Sufficient    Pregnancy  =========    Rene pregnancy. Number of fetuses: 1    Dating  ======    LMP on: 2023  GA by LMP 35 w + 1 d  MARTIN by LMP: 2024  Assigned: based on the LMP, selected on 2023  Assigned GA 35 w + 1 d  Assigned MARTIN: 2024    General Evaluation  ==============    Cardiac activity present.  bpm. Fetal movements: visualized. Presentation: Cephalic  Placenta: Placental site: posterior, appropriate distance from the internal os  Umbilical cord: Cord vessels: 3 vessel cord. Insertion site: placental insertion normal    Fetal Anatomy  ===========    Stomach: normal  Kidneys: normal  Bladder: normal  Wants to know fetal sex: yes    Amniotic Fluid Assessment  =====================    Amount of AF: normal  MVP 4.5 cm    Biophysical Profile  ==============    0: Fetal breathing movements  2: Gross body movements  2: Fetal tone  2: Amniotic fluid volume  NST: reactive  8/10 Biophysical profile score    Non Stress Test  =============    NST interpretation: reactive. Test duration 20 min. Baseline  bpm. Baseline variability: moderate. Accelerations: present. Decelerations: absent. Uterine activity:  absent. Acoustic stimulation: no    Findings  =======    Intrauterine Rene pregnancy at 35w 1d by clinical dates.  Amniotic fluid is normal.  Placenta is posterior, appropriate distance from the internal os.  Cephalic presentation.  Biophysical profile score is 8/10.  NST is reactive.    Consultation  ==========    Neisha is 37 y/o  seen for the followin. AMA/Long interpregnancy interval (20 yrs since first pregnancy)/HX Bariatric surgery  -Did not complete glucola due to hx of bariatric

## 2024-03-25 ENCOUNTER — HOSPITAL ENCOUNTER (OUTPATIENT)
Facility: HOSPITAL | Age: 39
Setting detail: INFUSION SERIES
Discharge: HOME | End: 2024-03-25
Payer: COMMERCIAL

## 2024-03-25 VITALS
DIASTOLIC BLOOD PRESSURE: 73 MMHG | SYSTOLIC BLOOD PRESSURE: 120 MMHG | HEART RATE: 73 BPM | RESPIRATION RATE: 18 BRPM | TEMPERATURE: 97.6 F

## 2024-03-25 DIAGNOSIS — O99.019 ANEMIA AFFECTING PREGNANCY, ANTEPARTUM: Primary | ICD-10-CM

## 2024-03-25 PROCEDURE — 96365 THER/PROPH/DIAG IV INF INIT: CPT

## 2024-03-25 PROCEDURE — 2580000003 HC RX 258: Performed by: OBSTETRICS & GYNECOLOGY

## 2024-03-25 PROCEDURE — 6360000002 HC RX W HCPCS: Performed by: OBSTETRICS & GYNECOLOGY

## 2024-03-25 RX ORDER — SODIUM CHLORIDE 0.9 % (FLUSH) 0.9 %
5-40 SYRINGE (ML) INJECTION PRN
Status: DISCONTINUED | OUTPATIENT
Start: 2024-03-25 | End: 2024-03-26 | Stop reason: HOSPADM

## 2024-03-25 RX ORDER — SODIUM CHLORIDE 9 MG/ML
5-250 INJECTION, SOLUTION INTRAVENOUS PRN
Status: DISCONTINUED | OUTPATIENT
Start: 2024-03-25 | End: 2024-03-26 | Stop reason: HOSPADM

## 2024-03-25 RX ORDER — SODIUM CHLORIDE 9 MG/ML
5-250 INJECTION, SOLUTION INTRAVENOUS PRN
OUTPATIENT
Start: 2024-04-01

## 2024-03-25 RX ORDER — SODIUM CHLORIDE 0.9 % (FLUSH) 0.9 %
5-40 SYRINGE (ML) INJECTION PRN
OUTPATIENT
Start: 2024-04-01

## 2024-03-25 RX ORDER — HEPARIN 100 UNIT/ML
500 SYRINGE INTRAVENOUS PRN
OUTPATIENT
Start: 2024-04-01

## 2024-03-25 RX ADMIN — SODIUM CHLORIDE 25 ML/HR: 9 INJECTION, SOLUTION INTRAVENOUS at 14:50

## 2024-03-25 RX ADMIN — SODIUM CHLORIDE 100 MG: 9 INJECTION, SOLUTION INTRAVENOUS at 15:06

## 2024-03-25 RX ADMIN — SODIUM CHLORIDE, PRESERVATIVE FREE 10 ML: 5 INJECTION INTRAVENOUS at 14:49

## 2024-03-25 NOTE — PROGRESS NOTES
\[facility]\"" Peds/Adult Note                   Date: 2024    Name: Neisha Rdz    MRN: [id number]       : 1985    1430 Patient arrives for Venofer (dose 1/3) without acute problems. Please see Epic for complete assessment and education provided.    Vital signs stable throughout and prior to discharge. Patient tolerated procedure well and was discharged without incident.  Patient is aware of next \[facility]\"" appointment on 2024. Appointment card give to the patient. Patient declined to wait 30 minutes post infusion.     Ms. Rdz's vitals were reviewed prior to and after treatment.   Patient Vitals for the past 12 hrs:   Temp Pulse Resp BP   24 1523 -- 73 18 120/73   24 1430 97.6 °F (36.4 °C) 80 18 116/80     Medications given:   Medications Administered         0.9 % sodium chloride infusion Admin Date  2024 Action  New Bag Dose  25 mL/hr Rate  25 mL/hr Route  IntraVENous Administered By  Jenna Stroud RN        iron sucrose (VENOFER) 100 mg in sodium chloride 0.9 % 100 mL IVPB Admin Date  2024 Action  New Bag Dose  100 mg Rate  460 mL/hr Route  IntraVENous Administered By  Jenna Stroud RN        sodium chloride flush 0.9 % injection 5-40 mL Admin Date  2024 Action  Given Dose  10 mL Rate   Route  IntraVENous Administered By  Jenna Stroud, MELISA              Ms. Rdz tolerated the infusion, and had no complaints.    [facility].     Future Appointments   Date Time Provider Department Center   3/28/2024  2:45 PM ULTRASOUND 2 AMB [facility]   2024  3:00 PM B4 PEDS [facility]   2024  2:00 PM Lucy Brand APRN - [facility]   2024  2:45 PM ULTRASOUND 1 AMB [facility]   2024  3:00 PM B4 PEDS [facility]   2024  3:00 PM Bhumi West MD [facility]   2024  2:45 PM ULTRASOUND 1 AMB [facility]   4/15/2024  9:00 AM L&D

## 2024-03-28 ENCOUNTER — ROUTINE PRENATAL (OUTPATIENT)
Age: 39
End: 2024-03-28
Payer: COMMERCIAL

## 2024-03-28 VITALS — SYSTOLIC BLOOD PRESSURE: 104 MMHG | DIASTOLIC BLOOD PRESSURE: 68 MMHG | HEART RATE: 83 BPM

## 2024-03-28 DIAGNOSIS — Z3A.36 36 WEEKS GESTATION OF PREGNANCY: ICD-10-CM

## 2024-03-28 DIAGNOSIS — E66.8 OTHER OBESITY AFFECTING PREGNANCY IN THIRD TRIMESTER: ICD-10-CM

## 2024-03-28 DIAGNOSIS — O09.523 ADVANCED MATERNAL AGE IN MULTIGRAVIDA, THIRD TRIMESTER: Primary | ICD-10-CM

## 2024-03-28 DIAGNOSIS — O99.213 OTHER OBESITY AFFECTING PREGNANCY IN THIRD TRIMESTER: ICD-10-CM

## 2024-03-28 PROCEDURE — 76819 FETAL BIOPHYS PROFIL W/O NST: CPT | Performed by: OBSTETRICS & GYNECOLOGY

## 2024-03-28 NOTE — PROGRESS NOTES
Please see the ultrasound report in Viewpoint filed under the Media and/or Imaging Tab in Epic. Thank you.

## 2024-03-28 NOTE — PROCEDURES
PATIENT: KIRILL LINCOLN   -  : 1985   -  DOS:2024   -  INTERPRETING PROVIDER:Walter Early,   Indication  ========    Advanced Maternal Age, History of bariatric surgery (gastric sleeve), obesity II    Method  ======    Transabdominal ultrasound examination. View: Sufficient    Pregnancy  =========    Rene pregnancy. Number of fetuses: 1    Dating  ======    LMP on: 2023  GA by LMP 36 w + 2 d  MARTIN by LMP: 2024  Assigned: based on the LMP, selected on 2023  Assigned GA 36 w + 2 d  Assigned MARTIN: 2024    General Evaluation  ==============    Cardiac activity present.  bpm. Fetal movements: visualized. Presentation: Cephalic  Placenta: Placental site: posterior, appropriate distance from the internal os  Umbilical cord: Cord vessels: 3 vessel cord. Insertion site: placental insertion normal    Fetal Anatomy  ===========    Stomach: normal  Kidneys: normal  Bladder: normal  Wants to know fetal sex: yes    Amniotic Fluid Assessment  =====================    Amount of AF: normal  MVP 5.0 cm. NICO 16.2 cm. Q1 3.2 cm, Q2 5.0 cm, Q3 3.9 cm, Q4 4.1 cm    Biophysical Profile  ==============    2: Fetal breathing movements  2: Gross body movements  2: Fetal tone  2: Amniotic fluid volume  8/8 Biophysical profile score  Interpretation: normal    Findings  =======    Intrauterine Rene pregnancy at 36w 2d by clinical dates.  Amniotic fluid is normal.  Placenta is posterior, appropriate distance from the internal os.  Biophysical profile score is 8/8.  Cephalic presentation.    Rene living intrauterine pregnancy at xx weeks and xx days.  Limited fetal anatomic survey  Reassuring ultrasound fetal biophysical profile score. Although these studies are reassuring, they do not guarantee a normal fetal outcome.  The patient left the office without meeting with the physician. She apparently did not feel it was necessary to make with a physician today. If patient has any

## 2024-04-01 ENCOUNTER — HOSPITAL ENCOUNTER (OUTPATIENT)
Facility: HOSPITAL | Age: 39
Setting detail: INFUSION SERIES
End: 2024-04-01

## 2024-04-03 ENCOUNTER — OFFICE VISIT (OUTPATIENT)
Age: 39
End: 2024-04-03

## 2024-04-03 VITALS — WEIGHT: 276 LBS | SYSTOLIC BLOOD PRESSURE: 124 MMHG | BODY MASS INDEX: 36.41 KG/M2 | DIASTOLIC BLOOD PRESSURE: 78 MMHG

## 2024-04-03 DIAGNOSIS — Z36.9 UNSPECIFIED ANTENATAL SCREENING: Primary | ICD-10-CM

## 2024-04-03 PROCEDURE — 0502F SUBSEQUENT PRENATAL CARE: CPT | Performed by: ADVANCED PRACTICE MIDWIFE

## 2024-04-03 NOTE — PROGRESS NOTES
+FM Pt reports some contractions.   Pt Doing well, denies any LOF, VB. We discussed BH contractions vs labor UC. Pt reports that they are happening occasionally, declines cervical exam today.   GBS today  C/S scheduled  FU 1 wk  All questions answered.     Kayy Ingram, ES - [facility]

## 2024-04-04 ENCOUNTER — ROUTINE PRENATAL (OUTPATIENT)
Age: 39
End: 2024-04-04
Payer: COMMERCIAL

## 2024-04-04 VITALS — SYSTOLIC BLOOD PRESSURE: 120 MMHG | DIASTOLIC BLOOD PRESSURE: 80 MMHG | HEART RATE: 97 BPM

## 2024-04-04 DIAGNOSIS — E66.09 CLASS 1 OBESITY DUE TO EXCESS CALORIES WITHOUT SERIOUS COMORBIDITY WITH BODY MASS INDEX (BMI) OF 32.0 TO 32.9 IN ADULT: ICD-10-CM

## 2024-04-04 DIAGNOSIS — Z3A.37 37 WEEKS GESTATION OF PREGNANCY: Primary | ICD-10-CM

## 2024-04-04 PROCEDURE — 76819 FETAL BIOPHYS PROFIL W/O NST: CPT | Performed by: OBSTETRICS & GYNECOLOGY

## 2024-04-05 LAB — GP B STREP DNA SPEC QL NAA+PROBE: NEGATIVE

## 2024-04-05 NOTE — PROCEDURES
PATIENT: NEISHA LINCOLN   -  : 1985   -  DOS:2024   -  INTERPRETING PROVIDER:Anita Kitchen,   Indication  ========    Advanced Maternal Age, History of bariatric surgery (gastric sleeve), obesity II.    Method  ======    Transabdominal ultrasound examination. View: Sufficient    Pregnancy  =========    Rene pregnancy. Number of fetuses: 1    Dating  ======    LMP on: 2023  GA by LMP 37 w + 2 d  MARTIN by LMP: 2024  Assigned: based on the LMP, selected on 2023  Assigned GA 37 w + 2 d  Assigned MARTIN: 2024    General Evaluation  ==============    Cardiac activity present.  bpm. Fetal movements: visualized. Presentation: Cephalic  Placenta: Placental site: posterior, appropriate distance from the internal os  Umbilical cord: Cord vessels: 3 vessel cord. Insertion site: placental insertion normal    Fetal Anatomy  ===========    Stomach: normal  Kidneys: normal  Bladder: normal  Wants to know fetal sex: yes    Amniotic Fluid Assessment  =====================    Amount of AF: normal  MVP 6.0 cm. NICO 17.7 cm. Q1 6.0 cm, Q2 4.4 cm, Q3 3.6 cm, Q4 3.8 cm    Biophysical Profile  ==============    2: Fetal breathing movements  2: Gross body movements  2: Fetal tone  2: Amniotic fluid volume  8/8 Biophysical profile score    Findings  =======    Intrauterine Rene pregnancy at 37w 2d by clinical dates.  Amniotic fluid is normal.  Placenta is posterior, appropriate distance from the internal os, posterior, appropriate distance from the internal os, posterior, appropriate distance from the internal os.  Biophysical profile score is 8/8.  Cephalic presentation.  Examination was technically difficult due to maternal body habitus. Not all anatomic structures can be optimally visualized today and may not be able to be seen during  this pregnancy. Ultrasound cannot detect all fetal abnormalities.    Consultation  ==========    Neisha is 37 y/o  seen for the followin.

## 2024-04-08 ENCOUNTER — HOSPITAL ENCOUNTER (OUTPATIENT)
Facility: HOSPITAL | Age: 39
Setting detail: INFUSION SERIES
End: 2024-04-08

## 2024-04-09 ENCOUNTER — ROUTINE PRENATAL (OUTPATIENT)
Age: 39
End: 2024-04-09

## 2024-04-09 VITALS — BODY MASS INDEX: 36.68 KG/M2 | WEIGHT: 278 LBS | SYSTOLIC BLOOD PRESSURE: 138 MMHG | DIASTOLIC BLOOD PRESSURE: 80 MMHG

## 2024-04-09 DIAGNOSIS — Z34.90 PREGNANCY, UNSPECIFIED GESTATIONAL AGE: ICD-10-CM

## 2024-04-09 DIAGNOSIS — D50.8 IRON DEFICIENCY ANEMIA SECONDARY TO INADEQUATE DIETARY IRON INTAKE: Primary | ICD-10-CM

## 2024-04-09 PROCEDURE — 0502F SUBSEQUENT PRENATAL CARE: CPT | Performed by: ADVANCED PRACTICE MIDWIFE

## 2024-04-09 NOTE — PROGRESS NOTES
C/S scheduled 4/16    Saw M 4/4/24:  Intrauterine Rene pregnancy at 37w 2d by clinical dates. Amniotic fluid is normal. Placenta is posterior, appropriate distance from the internal os, posterior, appropriate distance from the internal os, posterior, appropriate distance from the internal os. Biophysical profile score is 8/8. Cephalic presentation. Examina on was technically difficult due to maternal body habitus. Not all anatomic structures can be op jony visualized today and may not be able to be seen during this pregnancy. Ultrasound cannot detect all fetal abnormalities.

## 2024-04-09 NOTE — PROGRESS NOTES
+FM GBS Neg. Pt c/o contractions. Desires cervical check.  Cervix closed, but baby very low  Rev labor precautions  CBC today, unable to get more than 1 Venofer infusion due to cost  Discussed potential for blood transfusion for C/Section prn     25-Jan-2024

## 2024-04-10 LAB
BASOPHILS # BLD AUTO: 0 X10E3/UL (ref 0–0.2)
BASOPHILS NFR BLD AUTO: 0 %
EOSINOPHIL # BLD AUTO: 0.1 X10E3/UL (ref 0–0.4)
EOSINOPHIL NFR BLD AUTO: 2 %
ERYTHROCYTE [DISTWIDTH] IN BLOOD BY AUTOMATED COUNT: 16.2 % (ref 11.7–15.4)
HCT VFR BLD AUTO: 29.6 % (ref 34–46.6)
HGB BLD-MCNC: 9 G/DL (ref 11.1–15.9)
IMM GRANULOCYTES # BLD AUTO: 0 X10E3/UL (ref 0–0.1)
IMM GRANULOCYTES NFR BLD AUTO: 0 %
LYMPHOCYTES # BLD AUTO: 1.7 X10E3/UL (ref 0.7–3.1)
LYMPHOCYTES NFR BLD AUTO: 32 %
MCH RBC QN AUTO: 20.9 PG (ref 26.6–33)
MCHC RBC AUTO-ENTMCNC: 30.4 G/DL (ref 31.5–35.7)
MCV RBC AUTO: 69 FL (ref 79–97)
MONOCYTES # BLD AUTO: 0.5 X10E3/UL (ref 0.1–0.9)
MONOCYTES NFR BLD AUTO: 9 %
NEUTROPHILS # BLD AUTO: 3 X10E3/UL (ref 1.4–7)
NEUTROPHILS NFR BLD AUTO: 57 %
PLATELET # BLD AUTO: 270 X10E3/UL (ref 150–450)
RBC # BLD AUTO: 4.31 X10E6/UL (ref 3.77–5.28)
WBC # BLD AUTO: 5.3 X10E3/UL (ref 3.4–10.8)

## 2024-04-11 ENCOUNTER — ROUTINE PRENATAL (OUTPATIENT)
Age: 39
End: 2024-04-11

## 2024-04-11 VITALS — SYSTOLIC BLOOD PRESSURE: 131 MMHG | HEART RATE: 85 BPM | DIASTOLIC BLOOD PRESSURE: 83 MMHG

## 2024-04-11 DIAGNOSIS — O09.523 ADVANCED MATERNAL AGE IN MULTIGRAVIDA, THIRD TRIMESTER: Primary | ICD-10-CM

## 2024-04-11 NOTE — PROCEDURES
PATIENT: KIRILL LINCOLN   -  : 1985   -  DOS:2024   -  INTERPRETING PROVIDER:Jarod Mckeon,   Indication  ========    Advanced Maternal Age, History of bariatric surgery (gastric sleeve), obesity II.    Method  ======    Transabdominal ultrasound examination. View: Good view    Pregnancy  =========    Rene pregnancy. Number of fetuses: 1    Dating  ======    LMP on: 2023  GA by LMP 38 w + 2 d  MARTIN by LMP: 2024  Ultrasound examination on: 2024  GA by U/S based upon: AC, BPD, Femur, HC  GA by U/S 36 w + 4 d  MARTIN by U/S: 2024  Assigned: based on the LMP, selected on 2023  Assigned GA 38 w + 2 d  Assigned MARTIN: 2024    Fetal Biometry  ============    Standard  BPD 90.8 mm 36w 6d 35% Hadlock  .4 mm 37w 3d 39% Mandy  .4 mm 35w 6d 2% Hadlock  .9 mm 37w 1d 38% Hadlock  Femur 71.0 mm 36w 3d 12% Hadlock  EFW 3,036 g 37w 0d 28% Hadlock  EFW (lb) 6 lb  EFW (oz) 11 oz  EFW by: Hadlock (BPD-HC-AC-FL)  Other Structures   bpm    General Evaluation  ==============    Cardiac activity present.  bpm. Fetal movements: visualized. Presentation: Cephalic  Placenta: Placental site: posterior  Umbilical cord: Cord vessels: 3 vessel cord. Insertion site: placental insertion normal  Amniotic fluid: Amount of AF: normal. MVP 4.8 cm. NICO 16.7 cm. Q1 4.8 cm, Q2 3.5 cm, Q3 4.6 cm, Q4 3.9 cm    Fetal Anatomy  ===========    Stomach: normal  Kidneys: normal  Bladder: normal  Wants to know fetal sex: yes    Biophysical Profile  ==============    2: Fetal breathing movements  2: Gross body movements  2: Fetal tone  2: Amniotic fluid volume   Biophysical profile score    Findings  =======    Viable Rene pregnancy at 38w 2d by clinical dates.  EFW is 3036 g at 28%, abdominal circumference at 38%.  Anatomy visualized as stated above.  Amniotic fluid is normal.  Placenta is posterior.  Cephalic presentation.    Biophysical profile score is

## 2024-04-15 ENCOUNTER — HOSPITAL ENCOUNTER (OUTPATIENT)
Facility: HOSPITAL | Age: 39
Discharge: HOME | End: 2024-04-15
Attending: OBSTETRICS & GYNECOLOGY | Admitting: OBSTETRICS & GYNECOLOGY
Payer: COMMERCIAL

## 2024-04-15 VITALS
TEMPERATURE: 98.1 F | RESPIRATION RATE: 16 BRPM | DIASTOLIC BLOOD PRESSURE: 77 MMHG | OXYGEN SATURATION: 97 % | SYSTOLIC BLOOD PRESSURE: 116 MMHG | HEART RATE: 85 BPM

## 2024-04-15 LAB
BASOPHILS # BLD: 0 K/UL (ref 0–0.1)
BASOPHILS NFR BLD: 1 % (ref 0–1)
DIFFERENTIAL METHOD BLD: ABNORMAL
EOSINOPHIL # BLD: 0.1 K/UL (ref 0–0.4)
EOSINOPHIL NFR BLD: 2 % (ref 0–7)
ERYTHROCYTE [DISTWIDTH] IN BLOOD BY AUTOMATED COUNT: 16.8 % (ref 11.5–14.5)
HCT VFR BLD AUTO: 27.7 % (ref 35–47)
HGB BLD-MCNC: 8.7 G/DL (ref 11.5–16)
IMM GRANULOCYTES # BLD AUTO: 0 K/UL (ref 0–0.04)
IMM GRANULOCYTES NFR BLD AUTO: 0 % (ref 0–0.5)
LYMPHOCYTES # BLD: 1.4 K/UL (ref 0.8–3.5)
LYMPHOCYTES NFR BLD: 31 % (ref 12–49)
MCH RBC QN AUTO: 21.6 PG (ref 26–34)
MCHC RBC AUTO-ENTMCNC: 31.4 G/DL (ref 30–36.5)
MCV RBC AUTO: 68.7 FL (ref 80–99)
MONOCYTES # BLD: 0.4 K/UL (ref 0–1)
MONOCYTES NFR BLD: 8 % (ref 5–13)
NEUTS SEG # BLD: 2.7 K/UL (ref 1.8–8)
NEUTS SEG NFR BLD: 58 % (ref 32–75)
NRBC # BLD: 0 K/UL (ref 0–0.01)
NRBC BLD-RTO: 0 PER 100 WBC
PLATELET # BLD AUTO: 258 K/UL (ref 150–400)
PMV BLD AUTO: 11.4 FL (ref 8.9–12.9)
RBC # BLD AUTO: 4.03 M/UL (ref 3.8–5.2)
RBC MORPH BLD: ABNORMAL
RPR SER QL: NONREACTIVE
WBC # BLD AUTO: 4.6 K/UL (ref 3.6–11)

## 2024-04-15 PROCEDURE — 86592 SYPHILIS TEST NON-TREP QUAL: CPT

## 2024-04-15 PROCEDURE — 86850 RBC ANTIBODY SCREEN: CPT

## 2024-04-15 PROCEDURE — 86900 BLOOD TYPING SEROLOGIC ABO: CPT

## 2024-04-15 PROCEDURE — 86901 BLOOD TYPING SEROLOGIC RH(D): CPT

## 2024-04-15 PROCEDURE — 85025 COMPLETE CBC W/AUTO DIFF WBC: CPT

## 2024-04-15 PROCEDURE — 36415 COLL VENOUS BLD VENIPUNCTURE: CPT

## 2024-04-15 NOTE — PROGRESS NOTES
PAT completed. Labs collected and sent,  by doppler, consents signed, ERAS protocol booklet given with Gatorade and CHG soap. All questions answered at this time.

## 2024-04-16 ENCOUNTER — ANESTHESIA EVENT (OUTPATIENT)
Facility: HOSPITAL | Age: 39
End: 2024-04-16
Payer: COMMERCIAL

## 2024-04-16 ENCOUNTER — ANESTHESIA (OUTPATIENT)
Facility: HOSPITAL | Age: 39
End: 2024-04-16
Payer: COMMERCIAL

## 2024-04-16 ENCOUNTER — HOSPITAL ENCOUNTER (INPATIENT)
Facility: HOSPITAL | Age: 39
LOS: 2 days | Discharge: HOME | End: 2024-04-18
Attending: OBSTETRICS & GYNECOLOGY | Admitting: OBSTETRICS & GYNECOLOGY
Payer: COMMERCIAL

## 2024-04-16 DIAGNOSIS — Z98.891 HISTORY OF CESAREAN SECTION, UNKNOWN SCAR: Primary | ICD-10-CM

## 2024-04-16 PROBLEM — Z34.93 PREGNANT AND NOT YET DELIVERED IN THIRD TRIMESTER: Status: ACTIVE | Noted: 2024-04-16

## 2024-04-16 LAB
ABO + RH BLD: NORMAL
BLOOD GROUP ANTIBODIES SERPL: NORMAL
SPECIMEN EXP DATE BLD: NORMAL

## 2024-04-16 PROCEDURE — 3700000000 HC ANESTHESIA ATTENDED CARE: Performed by: OBSTETRICS & GYNECOLOGY

## 2024-04-16 PROCEDURE — 3700000001 HC ADD 15 MINUTES (ANESTHESIA): Performed by: OBSTETRICS & GYNECOLOGY

## 2024-04-16 PROCEDURE — 58700 REMOVAL OF FALLOPIAN TUBE: CPT | Performed by: OBSTETRICS & GYNECOLOGY

## 2024-04-16 PROCEDURE — 6360000002 HC RX W HCPCS: Performed by: OBSTETRICS & GYNECOLOGY

## 2024-04-16 PROCEDURE — 59514 CESAREAN DELIVERY ONLY: CPT | Performed by: OBSTETRICS & GYNECOLOGY

## 2024-04-16 PROCEDURE — 2580000003 HC RX 258: Performed by: OBSTETRICS & GYNECOLOGY

## 2024-04-16 PROCEDURE — 6370000000 HC RX 637 (ALT 250 FOR IP): Performed by: OBSTETRICS & GYNECOLOGY

## 2024-04-16 PROCEDURE — 7100000000 HC PACU RECOVERY - FIRST 15 MIN: Performed by: OBSTETRICS & GYNECOLOGY

## 2024-04-16 PROCEDURE — 59510 CESAREAN DELIVERY: CPT | Performed by: OBSTETRICS & GYNECOLOGY

## 2024-04-16 PROCEDURE — 1120000000 HC RM PRIVATE OB

## 2024-04-16 PROCEDURE — 2720000010 HC SURG SUPPLY STERILE: Performed by: OBSTETRICS & GYNECOLOGY

## 2024-04-16 PROCEDURE — 7100000001 HC PACU RECOVERY - ADDTL 15 MIN: Performed by: OBSTETRICS & GYNECOLOGY

## 2024-04-16 PROCEDURE — A4216 STERILE WATER/SALINE, 10 ML: HCPCS | Performed by: OBSTETRICS & GYNECOLOGY

## 2024-04-16 PROCEDURE — 6360000002 HC RX W HCPCS: Performed by: ANESTHESIOLOGY

## 2024-04-16 PROCEDURE — 2500000003 HC RX 250 WO HCPCS: Performed by: OBSTETRICS & GYNECOLOGY

## 2024-04-16 PROCEDURE — 88305 TISSUE EXAM BY PATHOLOGIST: CPT

## 2024-04-16 PROCEDURE — 0UT70ZZ RESECTION OF BILATERAL FALLOPIAN TUBES, OPEN APPROACH: ICD-10-PCS | Performed by: OBSTETRICS & GYNECOLOGY

## 2024-04-16 PROCEDURE — 6360000002 HC RX W HCPCS: Performed by: NURSE ANESTHETIST, CERTIFIED REGISTERED

## 2024-04-16 PROCEDURE — 2580000003 HC RX 258: Performed by: NURSE ANESTHETIST, CERTIFIED REGISTERED

## 2024-04-16 PROCEDURE — 2709999900 HC NON-CHARGEABLE SUPPLY: Performed by: OBSTETRICS & GYNECOLOGY

## 2024-04-16 PROCEDURE — 36415 COLL VENOUS BLD VENIPUNCTURE: CPT

## 2024-04-16 PROCEDURE — 3609079900 HC CESAREAN SECTION: Performed by: OBSTETRICS & GYNECOLOGY

## 2024-04-16 RX ORDER — MORPHINE SULFATE 4 MG/ML
4 INJECTION, SOLUTION INTRAMUSCULAR; INTRAVENOUS ONCE
Status: COMPLETED | OUTPATIENT
Start: 2024-04-16 | End: 2024-04-16

## 2024-04-16 RX ORDER — SODIUM CHLORIDE 0.9 % (FLUSH) 0.9 %
5-40 SYRINGE (ML) INJECTION EVERY 12 HOURS SCHEDULED
Status: DISCONTINUED | OUTPATIENT
Start: 2024-04-16 | End: 2024-04-18 | Stop reason: HOSPADM

## 2024-04-16 RX ORDER — SODIUM CHLORIDE 9 MG/ML
INJECTION, SOLUTION INTRAVENOUS PRN
Status: DISCONTINUED | OUTPATIENT
Start: 2024-04-16 | End: 2024-04-18 | Stop reason: HOSPADM

## 2024-04-16 RX ORDER — ACETAMINOPHEN 325 MG/1
975 TABLET ORAL ONCE
Status: COMPLETED | OUTPATIENT
Start: 2024-04-16 | End: 2024-04-16

## 2024-04-16 RX ORDER — ONDANSETRON 2 MG/ML
INJECTION INTRAMUSCULAR; INTRAVENOUS PRN
Status: DISCONTINUED | OUTPATIENT
Start: 2024-04-16 | End: 2024-04-16 | Stop reason: SDUPTHER

## 2024-04-16 RX ORDER — MISOPROSTOL 200 UG/1
800 TABLET ORAL PRN
Status: DISCONTINUED | OUTPATIENT
Start: 2024-04-16 | End: 2024-04-18 | Stop reason: HOSPADM

## 2024-04-16 RX ORDER — DOCUSATE SODIUM 100 MG/1
100 CAPSULE, LIQUID FILLED ORAL 2 TIMES DAILY PRN
Status: DISCONTINUED | OUTPATIENT
Start: 2024-04-16 | End: 2024-04-18 | Stop reason: HOSPADM

## 2024-04-16 RX ORDER — FENTANYL CITRATE 50 UG/ML
INJECTION, SOLUTION INTRAMUSCULAR; INTRAVENOUS PRN
Status: DISCONTINUED | OUTPATIENT
Start: 2024-04-16 | End: 2024-04-16 | Stop reason: SDUPTHER

## 2024-04-16 RX ORDER — ACETAMINOPHEN 500 MG
1000 TABLET ORAL EVERY 8 HOURS SCHEDULED
Status: DISCONTINUED | OUTPATIENT
Start: 2024-04-16 | End: 2024-04-18 | Stop reason: HOSPADM

## 2024-04-16 RX ORDER — SODIUM CHLORIDE 0.9 % (FLUSH) 0.9 %
10 SYRINGE (ML) INJECTION EVERY 12 HOURS SCHEDULED
Status: DISCONTINUED | OUTPATIENT
Start: 2024-04-16 | End: 2024-04-18 | Stop reason: HOSPADM

## 2024-04-16 RX ORDER — FENTANYL CITRATE 50 UG/ML
INJECTION, SOLUTION INTRAMUSCULAR; INTRAVENOUS CONTINUOUS PRN
Status: DISCONTINUED | OUTPATIENT
Start: 2024-04-16 | End: 2024-04-16

## 2024-04-16 RX ORDER — DULOXETIN HYDROCHLORIDE 60 MG/1
60 CAPSULE, DELAYED RELEASE ORAL DAILY
Status: DISCONTINUED | OUTPATIENT
Start: 2024-04-17 | End: 2024-04-18 | Stop reason: HOSPADM

## 2024-04-16 RX ORDER — ONDANSETRON 2 MG/ML
4 INJECTION INTRAMUSCULAR; INTRAVENOUS EVERY 6 HOURS PRN
Status: DISCONTINUED | OUTPATIENT
Start: 2024-04-16 | End: 2024-04-18 | Stop reason: HOSPADM

## 2024-04-16 RX ORDER — KETOROLAC TROMETHAMINE 30 MG/ML
30 INJECTION, SOLUTION INTRAMUSCULAR; INTRAVENOUS EVERY 6 HOURS
Status: COMPLETED | OUTPATIENT
Start: 2024-04-16 | End: 2024-04-17

## 2024-04-16 RX ORDER — DIPHENHYDRAMINE HYDROCHLORIDE 50 MG/ML
12.5 INJECTION INTRAMUSCULAR; INTRAVENOUS EVERY 4 HOURS PRN
Status: DISCONTINUED | OUTPATIENT
Start: 2024-04-16 | End: 2024-04-18 | Stop reason: HOSPADM

## 2024-04-16 RX ORDER — SODIUM CHLORIDE, SODIUM LACTATE, POTASSIUM CHLORIDE, CALCIUM CHLORIDE 600; 310; 30; 20 MG/100ML; MG/100ML; MG/100ML; MG/100ML
INJECTION, SOLUTION INTRAVENOUS CONTINUOUS
Status: DISCONTINUED | OUTPATIENT
Start: 2024-04-16 | End: 2024-04-18 | Stop reason: HOSPADM

## 2024-04-16 RX ORDER — OXYTOCIN/RINGER'S LACTATE 30/500 ML
PLASTIC BAG, INJECTION (ML) INTRAVENOUS PRN
Status: DISCONTINUED | OUTPATIENT
Start: 2024-04-16 | End: 2024-04-16 | Stop reason: SDUPTHER

## 2024-04-16 RX ORDER — MORPHINE SULFATE 2 MG/ML
2 INJECTION, SOLUTION INTRAMUSCULAR; INTRAVENOUS ONCE
Status: DISCONTINUED | OUTPATIENT
Start: 2024-04-16 | End: 2024-04-18 | Stop reason: HOSPADM

## 2024-04-16 RX ORDER — NALOXONE HYDROCHLORIDE 0.4 MG/ML
INJECTION, SOLUTION INTRAMUSCULAR; INTRAVENOUS; SUBCUTANEOUS PRN
Status: ACTIVE | OUTPATIENT
Start: 2024-04-16 | End: 2024-04-17

## 2024-04-16 RX ORDER — ONDANSETRON 2 MG/ML
4 INJECTION INTRAMUSCULAR; INTRAVENOUS EVERY 6 HOURS PRN
Status: ACTIVE | OUTPATIENT
Start: 2024-04-16 | End: 2024-04-17

## 2024-04-16 RX ORDER — IBUPROFEN 400 MG/1
800 TABLET ORAL EVERY 8 HOURS
Status: DISCONTINUED | OUTPATIENT
Start: 2024-04-17 | End: 2024-04-18 | Stop reason: HOSPADM

## 2024-04-16 RX ORDER — BUPIVACAINE HYDROCHLORIDE 7.5 MG/ML
INJECTION, SOLUTION INTRASPINAL
Status: DISCONTINUED | OUTPATIENT
Start: 2024-04-16 | End: 2024-04-16 | Stop reason: SDUPTHER

## 2024-04-16 RX ORDER — SODIUM CHLORIDE 0.9 % (FLUSH) 0.9 %
10 SYRINGE (ML) INJECTION PRN
Status: DISCONTINUED | OUTPATIENT
Start: 2024-04-16 | End: 2024-04-18 | Stop reason: HOSPADM

## 2024-04-16 RX ORDER — DEXAMETHASONE SODIUM PHOSPHATE 4 MG/ML
INJECTION, SOLUTION INTRA-ARTICULAR; INTRALESIONAL; INTRAMUSCULAR; INTRAVENOUS; SOFT TISSUE PRN
Status: DISCONTINUED | OUTPATIENT
Start: 2024-04-16 | End: 2024-04-16 | Stop reason: SDUPTHER

## 2024-04-16 RX ORDER — SIMETHICONE 80 MG
80 TABLET,CHEWABLE ORAL EVERY 6 HOURS PRN
Status: DISCONTINUED | OUTPATIENT
Start: 2024-04-16 | End: 2024-04-18 | Stop reason: HOSPADM

## 2024-04-16 RX ORDER — FENTANYL CITRATE 50 UG/ML
INJECTION, SOLUTION INTRAMUSCULAR; INTRAVENOUS PRN
Status: DISCONTINUED | OUTPATIENT
Start: 2024-04-16 | End: 2024-04-16

## 2024-04-16 RX ORDER — OXYCODONE HYDROCHLORIDE 5 MG/1
10 TABLET ORAL EVERY 4 HOURS PRN
Status: DISCONTINUED | OUTPATIENT
Start: 2024-04-17 | End: 2024-04-18 | Stop reason: HOSPADM

## 2024-04-16 RX ORDER — SODIUM CHLORIDE 0.9 % (FLUSH) 0.9 %
5-40 SYRINGE (ML) INJECTION PRN
Status: DISCONTINUED | OUTPATIENT
Start: 2024-04-16 | End: 2024-04-18 | Stop reason: HOSPADM

## 2024-04-16 RX ORDER — KETOROLAC TROMETHAMINE 30 MG/ML
INJECTION, SOLUTION INTRAMUSCULAR; INTRAVENOUS PRN
Status: DISCONTINUED | OUTPATIENT
Start: 2024-04-16 | End: 2024-04-16 | Stop reason: SDUPTHER

## 2024-04-16 RX ORDER — SODIUM CHLORIDE, SODIUM LACTATE, POTASSIUM CHLORIDE, CALCIUM CHLORIDE 600; 310; 30; 20 MG/100ML; MG/100ML; MG/100ML; MG/100ML
INJECTION, SOLUTION INTRAVENOUS CONTINUOUS
Status: DISPENSED | OUTPATIENT
Start: 2024-04-16 | End: 2024-04-17

## 2024-04-16 RX ORDER — GLYCOPYRROLATE 0.2 MG/ML
INJECTION INTRAMUSCULAR; INTRAVENOUS PRN
Status: DISCONTINUED | OUTPATIENT
Start: 2024-04-16 | End: 2024-04-16 | Stop reason: SDUPTHER

## 2024-04-16 RX ORDER — MORPHINE SULFATE 0.5 MG/ML
INJECTION, SOLUTION EPIDURAL; INTRATHECAL; INTRAVENOUS PRN
Status: DISCONTINUED | OUTPATIENT
Start: 2024-04-16 | End: 2024-04-16 | Stop reason: SDUPTHER

## 2024-04-16 RX ORDER — ONDANSETRON 4 MG/1
4 TABLET, ORALLY DISINTEGRATING ORAL EVERY 8 HOURS PRN
Status: DISCONTINUED | OUTPATIENT
Start: 2024-04-16 | End: 2024-04-18 | Stop reason: HOSPADM

## 2024-04-16 RX ORDER — OXYCODONE HYDROCHLORIDE 5 MG/1
5 TABLET ORAL EVERY 4 HOURS PRN
Status: DISCONTINUED | OUTPATIENT
Start: 2024-04-17 | End: 2024-04-18 | Stop reason: HOSPADM

## 2024-04-16 RX ORDER — SODIUM CHLORIDE, SODIUM LACTATE, POTASSIUM CHLORIDE, AND CALCIUM CHLORIDE .6; .31; .03; .02 G/100ML; G/100ML; G/100ML; G/100ML
1000 INJECTION, SOLUTION INTRAVENOUS ONCE
Status: DISCONTINUED | OUTPATIENT
Start: 2024-04-16 | End: 2024-04-18 | Stop reason: HOSPADM

## 2024-04-16 RX ORDER — MORPHINE SULFATE 0.5 MG/ML
INJECTION, SOLUTION EPIDURAL; INTRATHECAL; INTRAVENOUS PRN
Status: DISCONTINUED | OUTPATIENT
Start: 2024-04-16 | End: 2024-04-16

## 2024-04-16 RX ORDER — POLYETHYLENE GLYCOL 3350 17 G/17G
17 POWDER, FOR SOLUTION ORAL DAILY PRN
Status: DISCONTINUED | OUTPATIENT
Start: 2024-04-16 | End: 2024-04-18 | Stop reason: HOSPADM

## 2024-04-16 RX ORDER — MODIFIED LANOLIN
OINTMENT (GRAM) TOPICAL
Status: DISCONTINUED | OUTPATIENT
Start: 2024-04-16 | End: 2024-04-18 | Stop reason: HOSPADM

## 2024-04-16 RX ADMIN — FENTANYL CITRATE 50 MCG: 50 INJECTION, SOLUTION INTRAMUSCULAR; INTRAVENOUS at 13:07

## 2024-04-16 RX ADMIN — MORPHINE SULFATE 0.15 MG: 0.5 INJECTION, SOLUTION EPIDURAL; INTRATHECAL; INTRAVENOUS at 12:19

## 2024-04-16 RX ADMIN — Medication 167 ML: at 12:41

## 2024-04-16 RX ADMIN — SODIUM CHLORIDE 3000 MG: 9 INJECTION, SOLUTION INTRAVENOUS at 12:10

## 2024-04-16 RX ADMIN — SODIUM CHLORIDE, POTASSIUM CHLORIDE, SODIUM LACTATE AND CALCIUM CHLORIDE: 600; 310; 30; 20 INJECTION, SOLUTION INTRAVENOUS at 21:21

## 2024-04-16 RX ADMIN — PHENYLEPHRINE HYDROCHLORIDE 20 MCG/MIN: 10 INJECTION INTRAVENOUS at 12:20

## 2024-04-16 RX ADMIN — GLYCOPYRROLATE 0.2 MG: 0.2 INJECTION INTRAMUSCULAR; INTRAVENOUS at 12:32

## 2024-04-16 RX ADMIN — FENTANYL CITRATE 15 MCG: 50 INJECTION, SOLUTION INTRAMUSCULAR; INTRAVENOUS at 12:19

## 2024-04-16 RX ADMIN — ACETAMINOPHEN 975 MG: 325 TABLET ORAL at 12:00

## 2024-04-16 RX ADMIN — SODIUM CHLORIDE, PRESERVATIVE FREE 10 ML: 5 INJECTION INTRAVENOUS at 21:26

## 2024-04-16 RX ADMIN — ONDANSETRON HYDROCHLORIDE 4 MG: 2 INJECTION, SOLUTION INTRAMUSCULAR; INTRAVENOUS at 12:11

## 2024-04-16 RX ADMIN — KETOROLAC TROMETHAMINE 30 MG: 30 INJECTION, SOLUTION INTRAMUSCULAR; INTRAVENOUS at 13:28

## 2024-04-16 RX ADMIN — DEXAMETHASONE SODIUM PHOSPHATE 4 MG: 4 INJECTION, SOLUTION INTRAMUSCULAR; INTRAVENOUS at 12:23

## 2024-04-16 RX ADMIN — KETOROLAC TROMETHAMINE 30 MG: 30 INJECTION, SOLUTION INTRAMUSCULAR; INTRAVENOUS at 19:15

## 2024-04-16 RX ADMIN — FENTANYL CITRATE 50 MCG: 50 INJECTION, SOLUTION INTRAMUSCULAR; INTRAVENOUS at 13:08

## 2024-04-16 RX ADMIN — MORPHINE SULFATE 4 MG: 4 INJECTION, SOLUTION INTRAMUSCULAR; INTRAVENOUS at 16:13

## 2024-04-16 RX ADMIN — ACETAMINOPHEN 1000 MG: 500 TABLET ORAL at 23:33

## 2024-04-16 RX ADMIN — BUPIVACAINE HYDROCHLORIDE IN DEXTROSE 11 MG: 7.5 INJECTION, SOLUTION SUBARACHNOID at 12:19

## 2024-04-16 RX ADMIN — SODIUM CHLORIDE, POTASSIUM CHLORIDE, SODIUM LACTATE AND CALCIUM CHLORIDE: 600; 310; 30; 20 INJECTION, SOLUTION INTRAVENOUS at 11:54

## 2024-04-16 RX ADMIN — FAMOTIDINE 20 MG: 10 INJECTION, SOLUTION INTRAVENOUS at 12:02

## 2024-04-16 ASSESSMENT — PAIN SCALES - GENERAL
PAINLEVEL_OUTOF10: 8
PAINLEVEL_OUTOF10: 5

## 2024-04-16 ASSESSMENT — PAIN DESCRIPTION - LOCATION: LOCATION: INCISION

## 2024-04-16 ASSESSMENT — PAIN DESCRIPTION - DESCRIPTORS: DESCRIPTORS: SORE;SHARP

## 2024-04-16 NOTE — ANESTHESIA POSTPROCEDURE EVALUATION
Post-Anesthesia Evaluation and Assessment    Patient: Neisha Rdz MRN: [id number]  SSN: [id number]    YOB: 1985  Age: 38 y.o.  Sex: female      I have evaluated the patient and they are stable and ready for discharge from the PACU.     Cardiovascular Function/Vital Signs  Visit Vitals  /73   Pulse 65   Temp 97.6 °F (36.4 °C) (Oral)   Resp 16   SpO2 100%       Patient is status post Spinal anesthesia for Procedure(s):   SECTION (E R A S).    Nausea/Vomiting: None    Postoperative hydration reviewed and adequate.    Pain:      Managed    Neurological Status:       At baseline    Mental Status, Level of Consciousness: Alert and  oriented to person, place, and time    Pulmonary Status:       Adequate oxygenation and airway patent    Complications related to anesthesia: None    Post-anesthesia assessment completed. No concerns    Signed By: Kristofer Odom MD     2024            Department of Anesthesiology  Postprocedure Note    Patient: Neisha Rdz  MRN: [id number]  YOB: 1985  Date of evaluation: 2024    Procedure Summary       Date: 24 Room / Location: [facility] L&D 01 / [facility] L&D OR    Anesthesia Start: 1207 Anesthesia Stop: 1358    Procedure:  SECTION (E R A S) (Abdomen) Diagnosis:       Delivery by  section using transverse incision of lower segment of uterus      (Delivery by  section using transverse incision of lower segment of uterus [O82])    Surgeons: Bhumi West MD Responsible Provider: Kristofer Odom MD    Anesthesia Type: spinal ASA Status: 2            Anesthesia Type: No value filed.    Alyssa Phase I:      Alyssa Phase II:      Anesthesia Post Evaluation    No notable events documented.

## 2024-04-16 NOTE — PROGRESS NOTES
History & Physical    Name: Neisha Rdz MRN: [id number]  SSN: [id number]    YOB: 1985  Age: 38 y.o.  Sex: female        Subjective:     Estimated Date of Delivery: 24  OB History          2    Para   1    Term   1       0    AB   0    Living   1         SAB   0    IAB   0    Ectopic   0    Molar   0    Multiple   0    Live Births   1              Ms. Rdz is a 37 yo  at 39w0d admitted for scheduled repeat CS and bilateral risk-reducing salpingectomy.    H/o CS x1.     Anemic - hgb  8.7, s/p iron infusion x1    H/o bariatric surgery - gastric sleeve.    Rh pos, GBS neg.    Patient Active Problem List    Diagnosis Date Noted    Anemia affecting pregnancy, antepartum 2024    History of  section, unknown scar 2024    Short cervix 2023    History of bariatric surgery 2023    Class 1 obesity due to excess calories without serious comorbidity with body mass index (BMI) of 32.0 to 32.9 in adult 2023    Multigravida of advanced maternal age in second trimester 2023    Pregnant 2023     CS and bilat salpingectomy scheduled  at noon (PAT 4/15 at 9am)    Primary Provider: Brett    37 yo  with MARTIN 24 by 9 wk RUIZ US (unsure LMP).    H/o CS x1 - NRFS, was 10cm, but only pushed for a short duration when had FHR decelerations and was taken for CS. Strongly desires REPEAT CS at 39 wks, declines TOLAC. Also desires BTL/bilateral salpingectomy --> ethics consulted 10/16, approved by [facility] --> consents signed     IUP: ordonez viable IUP on 9 wk scan, ?chorionic bump vs. Hematoma --> no longer visible on 12 wk scan  -FAS  with MFM dt AMA and h/o bariatric surgery: SHORT CERVIX 2.3 in length --> repeat scan on  cervix measures 3.54 cm in length (normal)  -MFM : EFW 72%ile, AC 69%ile  -MFM : EFW EFW is 1475 g at 34%, abdominal circumference at 20%.   -[facility] 3/14: EFW is 2293 g at 33%, abdominal  circumference at 20%, cephalic  -MFM 3/20: BPP  -monthly growth scans, weekly ANS at 34 weeks due to AMA (near 40 at MARTIN)      Pregnancy Problems:  -severe N/V - resolved, unisom/B6, phenergan (zofran not tolerated)   -AMA - aneuploidy screen low risk, FAS with MFM, ANS, ASA 81mg recommended  -dizziness 3/15 - suspect due to BG dropping --> discussed dietary changes  -SOB 3/19 - referral to cardiology 3/19    PMH:  -h/o gastric sleeve - continue PNV, advised her to check with bariatric specialist about vitamin/micronutrient supplementation in pregnancy, discussed total vit A should be limited to 5000 IU daily to avoid retinoid embryopathy. Consider supplements of the following after bariatric procedures: B1 1.4mg, VitD 400IU, vitK 120mcg, zinc 11mg, biotin 30mcg, iron 65mg, folate 800mcg, calcium citrate 1200mg, vit B12 500mcg/day PO. Labs: B12, thiamine, folate, calcium, vit D wnl, Iron and Ferritin low, supplementation reocmmended.  Referral to MFM. No glucola.  -anxiety/depression - on cymbalta 60mg daily, refilled 1/9/24    Genetics/Carrier screening: panorama-Low Risk, declines horizon, MSAFP declined    PNL: 10/16/23 AB pos / ABSC neg / AA / Hgb 10.3, plts 257 / HIV, hepB, hepC, Tpal neg / Rubella and VZV Immune / Gc, Chl, Trich neg / urine UG penny / pap NILM HPV neg 9/18/23  -Glucola: NOT RECOMMENDED d/t bariatric surgery --> checked BG x 1 week all values normal, does NOT have GDM  -28 week labs: 2/6 Hgb 9.2, plt 247 (Iron po recommended)--> Repeat CBC 3/19 Hgb 8.7 --> microcytic anemia, refer for iron infusions.  Scheduled 3/25 did one and stopped due to cost ($1200) Repeat CBC 4/9___  -GBS: Negative     Vaccines:  -Flu: declined 10/16  -Covid: booster recommended  -Tdap: 2/6  -RSV: Rx provided 2/20    Delivery/PP plans:  -Breast/Formula  -NCB/Epid  -Gender/Circ?  -desires BTL    Social:  -FOB: ??Alvin, partner of 7-8 years, this is his first child  -19 yo son 'Claudia'  -pt works 5d per week as AGM of

## 2024-04-16 NOTE — PROGRESS NOTES
1125 Bedside and Verbal shift change report given to DARRIUS Barr (oncoming nurse) by DIANE Fernandez (offgoing nurse). Report included the following information Nurse Handoff Report, Intake/Output, MAR, and Recent Results.    1241 Delivery of live  female via  section by Dr. West.    1600 TRANSFER - OUT REPORT:    Verbal report given to LINDSEY Perla on Neisha Rdz  being transferred to MIU for routine progression of patient care       Report consisted of patient's Situation, Background, Assessment and   Recommendations(SBAR).     Information from the following report(s) Nurse Handoff Report, Intake/Output, MAR, and Recent Results was reviewed with the receiving nurse.           Lines:   Peripheral IV 24 Distal;Left;Posterior Forearm (Active)        Opportunity for questions and clarification was provided.      Patient transported with:  Registered Nurse

## 2024-04-16 NOTE — L&D DELIVERY NOTE
SHAWN Rdz [[id number]]      Labor Events     Labor: No   Steroids: None  Cervical Ripening Date/Time:      Antibiotics Received during Labor: No  Rupture Date/Time:      Rupture Type: AROM  Fluid Color: Clear  Fluid Odor: None  Fluid Volume: Moderate  Induction: None  Augmentation: None  Labor Complications: Meconium at birth              Anesthesia    Method: Spinal       Delivery Details      Delivery Date: 24 Delivery Time: 12:41:00   Delivery Type: , Low Transverse  Trial of Labor?: No   Categorization: Repeat   Priority: scheduled  Indications for : Prior Uterine Surgery       Skin Incision Type: Pfannenstiel  Uterine Incision: Low Transverse        Presentation    Presentation: Vertex       Shoulder Dystocia    Shoulder Dystocia Present?: No       Assisted Delivery Details    Forceps Attempted?: No  Vacuum Extractor Attempted?: No                           Cord    Vessels: 3 Vessels  Complications: None  Delayed Cord Clamping?: Yes  Cord Clamped Date/Time: 2024 12:43:00  Cord Blood Disposition: Discard  Gases Sent?: No              Placenta    Date/Time: 2024 12:40:00  Removal: Manual Removal  Appearance: Intact  Disposition: Discarded       Lacerations    Episiotomy: None  Perineal Lacerations: None  Other Lacerations: no non-perineal laceration       Blood Loss  Mother: Neisha Rdz #[id number]     Start of Mother's Information      Delivery Blood Loss  24 1207 - 24 1350      None                 End of Mother's Information  Mother: Neisha Rdz #[id number]                Delivery Providers    Delivering clinician: Bhumi West MD     Provider Role    Bhumi West MD Obstetrician    Gema Barr RN Primary Nurse    Angelina Fernandez RN Primary  Nurse     Anesthesiologist     Nurse Anesthetist               Assessment    Living Status: Living  Delivery Location Comment: L&D OR

## 2024-04-16 NOTE — ANESTHESIA PRE PROCEDURE
10/16/2023 03:52 PM       COVID-19 Screening (If Applicable):   Lab Results   Component Value Date/Time    COVID19 Not Detected 12/16/2020 12:20 PM           Anesthesia Evaluation  Patient summary reviewed and Nursing notes reviewed  Airway: Mallampati: II  TM distance: >3 FB   Neck ROM: full  Mouth opening: > = 3 FB   Dental: normal exam         Pulmonary:Negative Pulmonary ROS and normal exam                               Cardiovascular:Negative CV ROS  Exercise tolerance: good (>4 METS)                    Neuro/Psych:   Negative Neuro/Psych ROS              GI/Hepatic/Renal: Neg GI/Hepatic/Renal ROS            Endo/Other: Negative Endo/Other ROS                    Abdominal: normal exam            Vascular: negative vascular ROS.         Other Findings:             Anesthesia Plan      spinal     ASA 2       Induction: intravenous.    MIPS: Postoperative opioids intended and Prophylactic antiemetics administered.  Anesthetic plan and risks discussed with patient.      Plan discussed with CRNA.    Attending anesthesiologist reviewed and agrees with Preprocedure content      Post-op pain plan if not by surgeon: intrathecal narcotics            Kristofer Odom MD   4/16/2024

## 2024-04-16 NOTE — ANESTHESIA PROCEDURE NOTES
Spinal Block    Patient location during procedure: OR  End time: 4/16/2024 12:19 PM  Reason for block: primary anesthetic  Staffing  Performed: anesthesiologist   Anesthesiologist: Kristofer Odom MD  Performed by: Kristofer Odom MD  Authorized by: Kristofer Odom MD    Spinal Block  Patient position: sitting  Prep: DuraPrep  Patient monitoring: cardiac monitor, continuous pulse ox, frequent blood pressure checks and oxygen  Approach: midline  Location: L3/L4  Provider prep: mask and sterile gloves  Needle  Needle type: pencil-tip   Needle gauge: 24 G  Needle length: 4 in  Assessment  Sensory level: T4  Events: None  Swirl obtained: Yes  CSF: clear  Attempts: 1  Hemodynamics: stable  Preanesthetic Checklist  Completed: patient identified, IV checked, site marked, risks and benefits discussed, surgical/procedural consents, equipment checked, pre-op evaluation, timeout performed, anesthesia consent given, oxygen available, monitors applied/VS acknowledged and fire risk safety assessment completed and verbalized

## 2024-04-16 NOTE — OP NOTE
Section, Bilateral Salpingectomy    Procedure: RLTCS, bilateral salpingectomy  Pre-operative diagnosis: IUP   Post-operative diagnosis: s/p  delivery  Indications: 37 yo  at 39w0d admitted for scheduled repeat CS. H/o CS x1.   Surgeon: Bhumi West MD  Assistant: Shalini Hicks  the assistant surgeon, was present during the procedure.  The physician's presence was necessary due to concern for adhesions from prior CS and low hemoglobin.The assistant surgeon performed significant portions of the surgery, including incising tissue, clamping, control of bleeding with suturing and cauterization, and decision making at challenging portions of the procedure.  This assistance was necessary to accomplish the optimal outcome for the patient, above and beyond what a non-MD assistant could have provided.   Anesthesia: regional   Complications: none  EBL: 800cc  IVF: per anesthesia  UOP: 200cc clear yellow urine  Findings: Normal uterus, bilateral tubes, and ovaries. Minimal adhesive disease. Live born infant in cephalic presentation, no meconium, no nuchal cord. APGARs = 9,9, Weight =  pending   NICU present for delivery? No  Specimen: Bilateral fallopian tubes sent. Placenta was not sent to pathology  Medications: 3g ancef pre-operatively, 20U dilute pitocin after placental delivery  Disposition: Mother and infant to recovery room in stable condition    Procedure:  The patient was taken to the operating room, she was given regional anesthesia and was placed on the operating table in the supine position.  A hobbs catheter was placed to drain the bladder intra-operatively. She was prepped and draped in a sterile fashion and a time out was performed. Prior to incision, the level of anesthesia was rechecked and was found to be adequate.    A Pfannensteil incision was made 2cm above the pubic symphysis at site of prior scar. The incision was carried down sharply to the level of the rectus fascia. The

## 2024-04-16 NOTE — CONSULTS
Clinical Ethics Consultation Note    ERD Advisement    Consulted by Dr. West for this 38 year old female who has requested a salpingectomy to reduce the risk of cancer.? The patient will have repeat  today. The patient has increased risk for breast, ovarian, and/or uterine cancer due to obesity. The patient has been counseled regarding irreversible infertility. This procedure is consistent with the ERDs, as there is a strong likelihood of existing pathology in her fallopian tubes, and the procedure is morally justified.? For questions or concerns regarding this consultation, please contact me directly.    Giselle Salinas  Ethics Consultant?

## 2024-04-17 LAB
ERYTHROCYTE [DISTWIDTH] IN BLOOD BY AUTOMATED COUNT: 16.6 % (ref 11.5–14.5)
HCT VFR BLD AUTO: 24.6 % (ref 35–47)
HGB BLD-MCNC: 7.3 G/DL (ref 11.5–16)
MCH RBC QN AUTO: 21.1 PG (ref 26–34)
MCHC RBC AUTO-ENTMCNC: 29.7 G/DL (ref 30–36.5)
MCV RBC AUTO: 71.1 FL (ref 80–99)
NRBC # BLD: 0 K/UL (ref 0–0.01)
NRBC BLD-RTO: 0 PER 100 WBC
PLATELET # BLD AUTO: 224 K/UL (ref 150–400)
PMV BLD AUTO: 11.7 FL (ref 8.9–12.9)
RBC # BLD AUTO: 3.46 M/UL (ref 3.8–5.2)
WBC # BLD AUTO: 8.9 K/UL (ref 3.6–11)

## 2024-04-17 PROCEDURE — 6370000000 HC RX 637 (ALT 250 FOR IP)

## 2024-04-17 PROCEDURE — 36415 COLL VENOUS BLD VENIPUNCTURE: CPT

## 2024-04-17 PROCEDURE — 6370000000 HC RX 637 (ALT 250 FOR IP): Performed by: OBSTETRICS & GYNECOLOGY

## 2024-04-17 PROCEDURE — 85027 COMPLETE CBC AUTOMATED: CPT

## 2024-04-17 PROCEDURE — 2580000003 HC RX 258: Performed by: OBSTETRICS & GYNECOLOGY

## 2024-04-17 PROCEDURE — 1120000000 HC RM PRIVATE OB

## 2024-04-17 PROCEDURE — 6360000002 HC RX W HCPCS: Performed by: OBSTETRICS & GYNECOLOGY

## 2024-04-17 RX ORDER — FERROUS SULFATE 325(65) MG
325 TABLET ORAL 2 TIMES DAILY WITH MEALS
Status: DISCONTINUED | OUTPATIENT
Start: 2024-04-17 | End: 2024-04-18 | Stop reason: HOSPADM

## 2024-04-17 RX ADMIN — FERROUS SULFATE TAB 325 MG (65 MG ELEMENTAL FE) 325 MG: 325 (65 FE) TAB at 08:33

## 2024-04-17 RX ADMIN — SODIUM CHLORIDE, POTASSIUM CHLORIDE, SODIUM LACTATE AND CALCIUM CHLORIDE: 600; 310; 30; 20 INJECTION, SOLUTION INTRAVENOUS at 04:34

## 2024-04-17 RX ADMIN — DOCUSATE SODIUM 100 MG: 100 CAPSULE, LIQUID FILLED ORAL at 20:08

## 2024-04-17 RX ADMIN — IBUPROFEN 800 MG: 400 TABLET, FILM COATED ORAL at 16:13

## 2024-04-17 RX ADMIN — ACETAMINOPHEN 1000 MG: 500 TABLET ORAL at 20:08

## 2024-04-17 RX ADMIN — KETOROLAC TROMETHAMINE 30 MG: 30 INJECTION, SOLUTION INTRAMUSCULAR; INTRAVENOUS at 01:44

## 2024-04-17 RX ADMIN — DOCUSATE SODIUM 100 MG: 100 CAPSULE, LIQUID FILLED ORAL at 08:21

## 2024-04-17 RX ADMIN — ACETAMINOPHEN 1000 MG: 500 TABLET ORAL at 07:03

## 2024-04-17 RX ADMIN — DULOXETINE HYDROCHLORIDE 60 MG: 60 CAPSULE, DELAYED RELEASE ORAL at 20:08

## 2024-04-17 RX ADMIN — KETOROLAC TROMETHAMINE 30 MG: 30 INJECTION, SOLUTION INTRAMUSCULAR; INTRAVENOUS at 07:03

## 2024-04-17 ASSESSMENT — PAIN SCALES - GENERAL: PAINLEVEL_OUTOF10: 7

## 2024-04-17 ASSESSMENT — PAIN DESCRIPTION - LOCATION: LOCATION: ABDOMEN

## 2024-04-17 NOTE — PROGRESS NOTES
2210: Patient stated that when she had her gastric bypass in 2017 that the surgeon nicked her bladder/urethral tube and she now only urinates one to two times a day. Nurse Practitioner is aware and is fine with giving patient more time to urinate once the cathter comes out.

## 2024-04-17 NOTE — DISCHARGE INSTRUCTIONS
Postpartum Support Groups  We know that all of us are dealing with a tremendous amount of uncertainty, confusion and disruption to our daily lives, which may result in increased anxiety, depression and fear. If you are feeling unsettled or worse, please know that we are here to help. During this time of increased caution and care for one another, [facility] [place] ([facility]) is offering virtual support groups to ALL MOTHERS in [place] regardless of the age of your child/children as a way to help weather this emotional storm together. Social support is an important part of self-care during this time of physical distancing.  Virtual postpartum support group meetings available at www.[facility].org  Warm Line: [phone]        General activities  For vaginal deliveries, be up and moving around but remember to rest! Your body grew and birthed a small human! Be kind to yourself and allow your body to heal. You may gradually resume your normal activities as soon as you feel up to it. You may begin walking and strolling with the baby when you feel ready. Stay close to home the first few times in case you tire quickly. Do not push yourself. This is not about getting fit fast. This is allowing your body to have some movement which will aid in healing. Fresh air and sunshine are refreshing for both you and your baby.  Avoid heavy lifting, strenuous exercise, and excessive stair climbing.  If you delivered by  section, take your time. Give yourself some meliton! You should be up and moving multiple times per day, this will help you to feel better faster. However, be mindful and do not push yourself. If you have a day that you feel very uncomfortable, you likely did too much the day before. Rest and recognize your body is not ready for that level of activity yet. You will get there soon.  A good rule to follow is that you should not lift anything heavier than your baby in the care seat for the first 2 weeks.

## 2024-04-17 NOTE — PROGRESS NOTES
Post-Operative  Day 1    Neisha Rdz     Assessment: Post-Op day 1, stable    Acute surgical blood loss anemia on chronic iron deficiency anemia: VS WNL. Iron supplementation on discharge    Plan:   1. Routine post-operative care       Information for the patient's :  SHAWN Rdz [[id number]]   , Low Transverse      S: Patient doing well without significant complaint. Nausea and vomiting resolved, tolerating liquids, no flatus, hobbs out.    Vitals:  /79   Pulse 57   Temp 98 °F (36.7 °C) (Oral)   Resp 16   LMP 2023 (Exact Date)   SpO2 98%   Breastfeeding Unknown   Temp (24hrs), Av.8 °F (36.6 °C), Min:97.2 °F (36.2 °C), Max:98.9 °F (37.2 °C)      Last 24hr Input/Output:    Intake/Output Summary (Last 24 hours) at 2024 0831  Last data filed at 2024 0000  Gross per 24 hour   Intake --   Output 1040 ml   Net -1040 ml          Exam:        Patient without distress.      Lungs nonlabored respirations..  Abdomen, bowel sounds present, soft, expected tenderness, fundus firm Wound dressing intact     Perineum normal lochia noted               Lower extremities are negative for swelling, cords or tenderness.    Labs:   Lab Results   Component Value Date/Time    WBC 8.9 2024 07:06 AM    WBC 4.6 04/15/2024 09:33 AM    WBC 5.3 2024 03:43 PM    WBC 7.3 2024 12:00 AM    WBC 7.6 2024 12:00 AM    WBC 7.0 10/16/2023 03:52 PM    HGB 7.3 2024 07:06 AM    HGB 8.7 04/15/2024 09:33 AM    HGB 9.0 2024 03:43 PM    HGB 8.7 2024 12:00 AM    HGB 9.2 2024 12:00 AM    HGB 10.3 10/16/2023 03:52 PM    HCT 24.6 2024 07:06 AM    HCT 27.7 04/15/2024 09:33 AM    HCT 29.6 2024 03:43 PM    HCT 28.3 2024 12:00 AM    HCT 28.7 2024 12:00 AM    HCT 32.2 10/16/2023 03:52 PM     2024 07:06 AM     04/15/2024 09:33 AM     2024 03:43 PM     2024 12:00 AM     2024

## 2024-04-18 VITALS
HEART RATE: 66 BPM | TEMPERATURE: 98 F | RESPIRATION RATE: 16 BRPM | SYSTOLIC BLOOD PRESSURE: 136 MMHG | OXYGEN SATURATION: 98 % | DIASTOLIC BLOOD PRESSURE: 84 MMHG

## 2024-04-18 PROCEDURE — 6370000000 HC RX 637 (ALT 250 FOR IP)

## 2024-04-18 PROCEDURE — 99024 POSTOP FOLLOW-UP VISIT: CPT | Performed by: OBSTETRICS & GYNECOLOGY

## 2024-04-18 PROCEDURE — 6370000000 HC RX 637 (ALT 250 FOR IP): Performed by: OBSTETRICS & GYNECOLOGY

## 2024-04-18 RX ORDER — FERROUS SULFATE 325(65) MG
325 TABLET ORAL 2 TIMES DAILY WITH MEALS
Qty: 30 TABLET | Refills: 3 | Status: SHIPPED | OUTPATIENT
Start: 2024-04-18

## 2024-04-18 RX ORDER — OXYCODONE HYDROCHLORIDE 5 MG/1
5 TABLET ORAL EVERY 6 HOURS PRN
Qty: 8 TABLET | Refills: 0 | Status: SHIPPED | OUTPATIENT
Start: 2024-04-18 | End: 2024-04-21

## 2024-04-18 RX ORDER — IBUPROFEN 800 MG/1
800 TABLET ORAL EVERY 8 HOURS
Qty: 120 TABLET | Refills: 0 | Status: SHIPPED | OUTPATIENT
Start: 2024-04-18

## 2024-04-18 RX ORDER — PSEUDOEPHEDRINE HCL 30 MG
100 TABLET ORAL 2 TIMES DAILY PRN
Qty: 60 CAPSULE | Refills: 0 | Status: SHIPPED | OUTPATIENT
Start: 2024-04-18

## 2024-04-18 RX ADMIN — IBUPROFEN 800 MG: 400 TABLET, FILM COATED ORAL at 07:21

## 2024-04-18 RX ADMIN — FERROUS SULFATE TAB 325 MG (65 MG ELEMENTAL FE) 325 MG: 325 (65 FE) TAB at 07:21

## 2024-04-18 NOTE — PROGRESS NOTES
Postpartum Progress Note    Subjective: Pt doing well POD2. No acute events overnight. Pain controlled. Lochia less than menses. Tolerating diet w/o N/V. Hobbs out, voiding without difficulty. Ambulating without assistance. Passing flatus. Scant edema. Denies f/c, CP, SOB, or other concerns. Formula feeding.     Objective:  Patient Vitals for the past 24 hrs:   BP Temp Temp src Pulse Resp SpO2   04/18/24 0425 129/74 98.1 °F (36.7 °C) Oral 65 16 98 %   04/17/24 2005 116/71 98.1 °F (36.7 °C) Oral 77 16 97 %   04/17/24 1601 120/74 97.9 °F (36.6 °C) Oral 88 16 100 %   04/17/24 1142 126/78 97.8 °F (36.6 °C) Oral 74 14 96 %   04/17/24 0820 131/82 98.2 °F (36.8 °C) Oral 71 16 100 %       Physical Exam:  Gen-A&O, NAD, resting comfortably   CV-regular rate  Resp-non-labored  Abd-nt, nd, fundus firm below the umbilicus  Incision-c/d/i  -scant blood on pad  Ext-trace edema    Last 3 CBC:   Recent Labs     04/15/24  0933 04/17/24  0706   WBC 4.6 8.9   RBC 4.03 3.46*   HGB 8.7* 7.3*   HCT 27.7* 24.6*   MCV 68.7* 71.1*   MCH 21.6* 21.1*   MCHC 31.4 29.7*   RDW 16.8* 16.6*    224   MPV 11.4 11.7        Assessment/Plan:  38 y.o. POD2 s/p LTCS and bilat salpingectomy, uncomplicated. Overall doing well.     Routine post-op care:  -PO pain meds  -general diet as tolerated  -hobbs out, voiding spontaneously  -cont to monitor bleeding  -stool softeners, antiemetics, benadryl prn  -encourage ambulation and incentive spirometry    Anemia: acute blood loss anemia on chronic iron deficiency anemia, hgb 8.7 pre-op--> 7.3 post-op  -not requiring transfusion at this time  -Iron supplementation on discharge    PMH:   -gastric sleeve  -anxiety/depression, cont cymbalta 60mg daily    PNL: AB pos / pap NILM HPV neg 9/2023    Postpartum plans:  -feeding: formula  -GIRL!    Dispo: anticipate discharge home PPD2-3  -follow up in 6 wks for routine PP visit or sooner prn    MD [facility]

## 2024-04-18 NOTE — DISCHARGE SUMMARY
Obstetrical Discharge Summary     Name: Neisha Rdz MRN: [id number]  SSN: [id number]    YOB: 1985  Age: 38 y.o.  Sex: female      Admit Date: 2024    Discharge Date: 2024     Admitting Physician: Bhumi West MD     Attending Physician:  Bhumi West MD     Admission Diagnoses: Delivery by  section using transverse incision of lower segment of uterus [O82]  Pregnant and not yet delivered in third trimester [Z34.93]    Discharge Diagnoses:   Information for the patient's :  SHAWN Rdz [[id number]]   @[id number]@      Additional Diagnoses:  No components found for: \"OBEXTABORH\", \"OBEXTABSCRN\", \"OBEXTRUBELLA\", \"OBEXTGRBS\"    Hospital Course: Uncomplicated RLTCS and bilateral salpingectomy. Normal hospital course following the delivery. Stable for discharge home PPD2.     Patient Instructions:   Current Discharge Medication List        START taking these medications    Details   ibuprofen (ADVIL;MOTRIN) 800 MG tablet Take 1 tablet by mouth in the morning and 1 tablet at noon and 1 tablet in the evening.  Qty: 120 tablet, Refills: 0      ferrous sulfate (IRON 325) 325 (65 Fe) MG tablet Take 1 tablet by mouth 2 times daily (with meals)  Qty: 30 tablet, Refills: 3      docusate sodium (COLACE, DULCOLAX) 100 MG CAPS Take 100 mg by mouth 2 times daily as needed for Constipation  Qty: 60 capsule, Refills: 0      oxyCODONE (ROXICODONE) 5 MG immediate release tablet Take 1 tablet by mouth every 6 hours as needed for Pain for up to 3 days. Max Daily Amount: 20 mg  Qty: 8 tablet, Refills: 0    Comments: Reduce doses taken as pain becomes manageable  Associated Diagnoses: History of  section, unknown scar           CONTINUE these medications which have NOT CHANGED    Details   DULoxetine (CYMBALTA) 60 MG extended release capsule Take 1 capsule by mouth daily  Qty: 30 capsule, Refills: 3      glucose monitoring kit Check blood sugars 4 times daily (fasting in  the morning before eating, then 1 hour after breakfast, lunch, and dinner) Please provide materials covered by patient insurance  Qty: 1 kit, Refills: 0      Lancets MISC Check blood sugars 4 times daily (fasting in the morning before eating, then 1 hour after breakfast, lunch, and dinner) Please provide materials covered by patient insurance  Qty: 100 each, Refills: 5      Prenatal Vit-Fe Fumarate-FA (PRENATAL PO) Take by mouth           STOP taking these medications       aspirin 81 MG chewable tablet Comments:   Reason for Stopping:         blood glucose monitor strips Comments:   Reason for Stopping:         promethazine (PHENERGAN) 12.5 MG tablet Comments:   Reason for Stopping:         ondansetron (ZOFRAN) 4 MG tablet Comments:   Reason for Stopping:         ALPRAZolam (XANAX) 2 MG tablet Comments:   Reason for Stopping:               Reference my discharge instructions.    No follow-ups on file.     Signed By:  Bhumi West MD     April 18, 2024

## 2024-04-25 RX ORDER — DULOXETIN HYDROCHLORIDE 60 MG/1
60 CAPSULE, DELAYED RELEASE ORAL DAILY
Qty: 30 CAPSULE | Refills: 3 | Status: SHIPPED | OUTPATIENT
Start: 2024-04-25

## 2024-04-30 RX ORDER — DULOXETIN HYDROCHLORIDE 60 MG/1
60 CAPSULE, DELAYED RELEASE ORAL DAILY
Qty: 30 CAPSULE | Refills: 3 | OUTPATIENT
Start: 2024-04-30

## 2024-05-07 ENCOUNTER — POSTPARTUM VISIT (OUTPATIENT)
Age: 39
End: 2024-05-07
Payer: COMMERCIAL

## 2024-05-07 VITALS — BODY MASS INDEX: 34.22 KG/M2 | DIASTOLIC BLOOD PRESSURE: 66 MMHG | SYSTOLIC BLOOD PRESSURE: 108 MMHG | WEIGHT: 259.4 LBS

## 2024-05-07 DIAGNOSIS — Z51.89 VISIT FOR WOUND CHECK: Primary | ICD-10-CM

## 2024-05-07 PROCEDURE — 99213 OFFICE O/P EST LOW 20 MIN: CPT | Performed by: OBSTETRICS & GYNECOLOGY

## 2024-05-07 NOTE — PROGRESS NOTES
Wound check    Neisha Rdz is a 39 y.o. female who presents for a postpartum exam wound check, numbness around incision.     S/p Uncomplicated RLTCS and bilateral salpingectomy on 04/16/24 GIRL \"Brooke!    Mom and baby are doing well.     Mood stable, denies anxiety/depression, EDS 4.    Incision/Perineum healing well.     Bleeding/lochia appropriate. Has not yet had menses.    The patient is formula feeding without difficulty.       /66   Wt 117.7 kg (259 lb 6.4 oz)   LMP 07/18/2023 (Exact Date)   Breastfeeding No   BMI 34.22 kg/m²     PHYSICAL EXAMINATION    Constitutional  Appearance: well-nourished, well developed, alert, in no acute distress    HENT  Head and Face: appears normal    Gastrointestinal  Abdominal Examination: abdomen non-tender to palpation, normal bowel sounds, no masses present  Liver and spleen: no hepatomegaly present, spleen not palpable  Hernias: no hernias identified    Neurologic/Psychiatric  Mental Status:  Orientation: grossly oriented to person, place and time  Mood and Affect: mood normal, affect appropriate    Assessment/Plan:  Normal postpartum check    -reassurance wound healing well, some numbness normal at this stage    RTC for 6 wk postpartum check up or sooner pralice West MD  5/7/2024  11:44 AM

## 2024-05-07 NOTE — PROGRESS NOTES
Chief Complaint   Patient presents with    Postpartum Care       Neisha Rdz is a 39 y.o. female returns for a postpartum incision check.  Her main concerns today include: numbness above incision site    S/p Uncomplicated RLTCS and bilateral salpingectomy on 24 'Brooke'    Postpartum Depression: Low Risk  (2024)    [place]  Depression Scale     Last EPDS Total Score: 4     Last EPDS Self Harm Result: Never     Breastfeeding: Formula feeding  Bleeding Resolved: Some intermittent bleeding    1. Have you been to the ER, urgent care clinic, or hospitalized since your last visit?No    2. Have you seen or consulted any other health care providers outside of the [facility] since your last visit? No    She declines  a chaperone during the gynecologic exam today.      Cintia Flowers LPN

## 2024-05-13 DIAGNOSIS — M54.31 BILATERAL SCIATICA: Primary | ICD-10-CM

## 2024-05-13 DIAGNOSIS — M54.32 BILATERAL SCIATICA: Primary | ICD-10-CM

## 2024-05-28 ASSESSMENT — PATIENT HEALTH QUESTIONNAIRE - PHQ9
SUM OF ALL RESPONSES TO PHQ QUESTIONS 1-9: 1
1. LITTLE INTEREST OR PLEASURE IN DOING THINGS: NOT AT ALL
2. FEELING DOWN, DEPRESSED OR HOPELESS: SEVERAL DAYS
SUM OF ALL RESPONSES TO PHQ9 QUESTIONS 1 & 2: 1
1. LITTLE INTEREST OR PLEASURE IN DOING THINGS: NOT AT ALL
SUM OF ALL RESPONSES TO PHQ QUESTIONS 1-9: 1
2. FEELING DOWN, DEPRESSED OR HOPELESS: SEVERAL DAYS
SUM OF ALL RESPONSES TO PHQ QUESTIONS 1-9: 1
SUM OF ALL RESPONSES TO PHQ9 QUESTIONS 1 & 2: 1
SUM OF ALL RESPONSES TO PHQ QUESTIONS 1-9: 1

## 2024-05-29 ENCOUNTER — POSTPARTUM VISIT (OUTPATIENT)
Age: 39
End: 2024-05-29

## 2024-05-29 VITALS — BODY MASS INDEX: 33.91 KG/M2 | WEIGHT: 257 LBS | DIASTOLIC BLOOD PRESSURE: 80 MMHG | SYSTOLIC BLOOD PRESSURE: 124 MMHG

## 2024-05-29 DIAGNOSIS — Z51.89 VISIT FOR WOUND CHECK: ICD-10-CM

## 2024-05-29 PROBLEM — Z34.93 PREGNANT AND NOT YET DELIVERED IN THIRD TRIMESTER: Status: RESOLVED | Noted: 2024-04-16 | Resolved: 2024-05-29

## 2024-05-29 PROBLEM — Z34.90 PREGNANT: Status: RESOLVED | Noted: 2023-09-18 | Resolved: 2024-05-29

## 2024-05-29 PROBLEM — O09.522 MULTIGRAVIDA OF ADVANCED MATERNAL AGE IN SECOND TRIMESTER: Status: RESOLVED | Noted: 2023-12-19 | Resolved: 2024-05-29

## 2024-05-29 PROBLEM — Z98.891 HISTORY OF CESAREAN SECTION, UNKNOWN SCAR: Status: RESOLVED | Noted: 2024-01-11 | Resolved: 2024-05-29

## 2024-05-29 PROBLEM — N88.3 SHORT CERVIX: Status: RESOLVED | Noted: 2023-12-19 | Resolved: 2024-05-29

## 2024-05-29 PROCEDURE — 0503F POSTPARTUM CARE VISIT: CPT | Performed by: OBSTETRICS & GYNECOLOGY

## 2024-05-29 NOTE — PROGRESS NOTES
Chief Complaint   Patient presents with    Postpartum Care         Neisha Rdz is a 39 y.o. female returns for a routine post-partum follow-up visit.     S/p Uncomplicated RLTCS and bilateral salpingectomy on 24 GIRL \"Brooke!     Postpartum Depression: Low Risk  (2024)    [place]  Depression Scale     Last EPDS Total Score: 4     Last EPDS Self Harm Result: Never       Breastfeeding: Formula feeding  Bleeding Resolved: is on her cycle now.  Reviewed birth control options.  Bilateral salpingectomy performed during C/S 24  Last Pap: pap NILM HPV neg 23       1. Have you been to the ER, urgent care clinic, or hospitalized since your last visit?No    2. Have you seen or consulted any other health care providers outside of the [facility] since your last visit? No    She declines  a chaperone during the gynecologic exam today.      Marie Campbell MA

## 2024-05-29 NOTE — PROGRESS NOTES
Postpartum evaluation    Neisha Rdz is a 39 y.o. female who presents for a postpartum exam.     She is now six weeks post repeat  section and and bilateral salpingectomy on 24 GIRL \"Brooke!    Mom and baby are doing well.     Concerns: denies    Mood stable, denies anxiety/depression, EDS 4.    Incision/Perineum healing well.     Bleeding/lochia appropriate. Just had first menstrual cycle.    The patient is formula feeding without difficulty.     Last pap: utd 23 NILM    /80   Wt 116.6 kg (257 lb)   LMP 2024 (Approximate)   Breastfeeding No   BMI 33.91 kg/m²     PHYSICAL EXAMINATION    Constitutional  Appearance: well-nourished, well developed, alert, in no acute distress    HENT  Head and Face: appears normal    Gastrointestinal  Abdominal Examination: abdomen non-tender to palpation, normal bowel sounds, no masses present, +pfannenstiel incision healing well  Liver and spleen: no hepatomegaly present, spleen not palpable  Hernias: no hernias identified    Neurologic/Psychiatric  Mental Status:  Orientation: grossly oriented to person, place and time  Mood and Affect: mood normal, affect appropriate    Assessment/Plan:  Normal postpartum check    RTC for AE or sooner hanh West MD  2024  2:01 PM

## 2024-06-18 ENCOUNTER — HOSPITAL ENCOUNTER (OUTPATIENT)
Age: 39
Discharge: HOME | End: 2024-06-21
Payer: COMMERCIAL

## 2024-06-18 ENCOUNTER — TRANSCRIBE ORDERS (OUTPATIENT)
Age: 39
End: 2024-06-18

## 2024-06-18 DIAGNOSIS — M54.50 LUMBAR PAIN: ICD-10-CM

## 2024-06-18 DIAGNOSIS — M54.50 LUMBAR PAIN: Primary | ICD-10-CM

## 2024-06-18 PROCEDURE — 72100 X-RAY EXAM L-S SPINE 2/3 VWS: CPT

## 2024-06-18 PROCEDURE — 72110 X-RAY EXAM L-2 SPINE 4/>VWS: CPT

## 2024-06-24 ENCOUNTER — PATIENT MESSAGE (OUTPATIENT)
Age: 39
End: 2024-06-24

## 2024-07-08 NOTE — TELEPHONE ENCOUNTER
Fax received from [facility] requesting office notes, progress, notes, etc.  Request forwarded to [employer].  Satish msg sent to patient advising her of this.

## 2025-03-11 ENCOUNTER — TELEPHONE (OUTPATIENT)
Age: 40
End: 2025-03-11

## 2025-03-11 NOTE — TELEPHONE ENCOUNTER
Left message for patient to call back to be scheduled. Referred by Dr. Bhumi West.     Dx: SOB, Dizzy Spells, High risk pregnancy

## (undated) DEVICE — SOLUTION IRRIG 1000ML STRL H2O USP PLAS POUR BTL

## (undated) DEVICE — DEVICE ES L3M EDGE COAT HEX LOK BLDE ELECTRD HOLSTER FORC

## (undated) DEVICE — 3000CC GUARDIAN II: Brand: GUARDIAN

## (undated) DEVICE — SUTURE VICRYL SZ 1 L36IN ABSRB VLT L36MM CT-1 1/2 CIR J347H

## (undated) DEVICE — ATTACHMENT SMK 3/8INX10FT VALLEYLAB

## (undated) DEVICE — CATHETER URIN 16FR 30CC BLLN 2 W F LUBRI-SIL IC

## (undated) DEVICE — SUTURE MONOCRYL SZ 4-0 L27IN ABSRB UD L24MM PS-1 3/8 CIR PRIM Y935H

## (undated) DEVICE — SOLUTION IRRIG 1000ML 0.9% SOD CHL USP POUR PLAS BTL

## (undated) DEVICE — KENDALL SCD EXPRESS SLEEVES, KNEE LENGTH, MEDIUM: Brand: KENDALL SCD

## (undated) DEVICE — ATTACHMENT SMK EVAC 15 FTX3/8 IN

## (undated) DEVICE — GLOVE SURG SZ 65 L12IN FNGR THK79MIL GRN LTX FREE

## (undated) DEVICE — DISSECTOR ENDOSCP L21CM TIP CURVATURE 40DEG FN CRV JAW VES

## (undated) DEVICE — SUTURE MONOCRYL SZ 0 L36IN ABSRB UD L36MM CT-1 1/2 CIR Y946H

## (undated) DEVICE — SUTURE VICRYL SZ 0 L36IN ABSRB UD L40MM CT 1/2 CIR TAPERPOINT J958H

## (undated) DEVICE — COVERALLS PROTCT 2XL WHT SMS ANTISTATIC PREM KNIT CUF FULL

## (undated) DEVICE — GLOVE SURG SZ 65 THK91MIL LTX FREE SYN POLYISOPRENE

## (undated) DEVICE — ROYALSILK SURGICAL GOWN, L: Brand: CONVERTORS

## (undated) DEVICE — SPROTTE TRAY 24G X 4'' (103MM) NEEDLE WITH 40MM INTRODUCER (SPINAL)

## (undated) DEVICE — Z DISCONTINUED NO SUB SUTURE PLN GUT SZ 2-0 L27IN ABSRB YELLOWISH TAN L70MM XLH 53T

## (undated) DEVICE — PACK PROCEDURE SURG C SECT KT SMH

## (undated) DEVICE — DEVON™ KNEE AND BODY STRAP 60" X 3" (1.5 M X 7.6 CM): Brand: DEVON

## (undated) DEVICE — SPONGE LAPAROTOMY W18XL18IN WHITE STRUNG RADIOPAQUE STERILE

## (undated) DEVICE — MEDI-VAC NON-CONDUCTIVE SUCTION TUBING: Brand: CARDINAL HEALTH

## (undated) DEVICE — AGENT HEMSTAT 3GM PURIFIED PLNT STARCH PWD ABSRB ARISTA AH

## (undated) DEVICE — APPLICATOR MEDICATED 26 CC SOLUTION HI LT ORNG CHLORAPREP

## (undated) DEVICE — SOLIDIFIER FLUID 1.3 OZ GEL 1200CC NS

## (undated) DEVICE — ELECTRODE PT RET AD L9FT HI MOIST COND ADH HYDRGEL CORDED

## (undated) DEVICE — DRESSING SIL W4XL5IN ANTIBACT GELLING FBR CYTOFORM